# Patient Record
Sex: FEMALE | Race: AMERICAN INDIAN OR ALASKA NATIVE | HISPANIC OR LATINO | URBAN - METROPOLITAN AREA
[De-identification: names, ages, dates, MRNs, and addresses within clinical notes are randomized per-mention and may not be internally consistent; named-entity substitution may affect disease eponyms.]

---

## 2018-05-28 ENCOUNTER — EMERGENCY (EMERGENCY)
Facility: HOSPITAL | Age: 38
LOS: 1 days | Discharge: HOME | End: 2018-05-28
Attending: EMERGENCY MEDICINE | Admitting: EMERGENCY MEDICINE
Payer: MEDICAID

## 2018-05-28 VITALS
HEART RATE: 81 BPM | SYSTOLIC BLOOD PRESSURE: 123 MMHG | HEIGHT: 61.81 IN | TEMPERATURE: 98 F | OXYGEN SATURATION: 98 % | WEIGHT: 149.91 LBS | RESPIRATION RATE: 18 BRPM | DIASTOLIC BLOOD PRESSURE: 85 MMHG

## 2018-05-28 VITALS
RESPIRATION RATE: 18 BRPM | DIASTOLIC BLOOD PRESSURE: 83 MMHG | TEMPERATURE: 98 F | SYSTOLIC BLOOD PRESSURE: 120 MMHG | HEART RATE: 67 BPM | OXYGEN SATURATION: 98 %

## 2018-05-28 DIAGNOSIS — R10.11 RIGHT UPPER QUADRANT PAIN: ICD-10-CM

## 2018-05-28 DIAGNOSIS — N20.0 CALCULUS OF KIDNEY: ICD-10-CM

## 2018-05-28 LAB
ALBUMIN SERPL ELPH-MCNC: 4.8 G/DL — SIGNIFICANT CHANGE UP (ref 3.3–5)
ALP SERPL-CCNC: 72 U/L — SIGNIFICANT CHANGE UP (ref 40–120)
ALT FLD-CCNC: 33 U/L — SIGNIFICANT CHANGE UP (ref 10–45)
ANION GAP SERPL CALC-SCNC: 12 MMOL/L — SIGNIFICANT CHANGE UP (ref 5–17)
APPEARANCE UR: CLEAR — SIGNIFICANT CHANGE UP
AST SERPL-CCNC: 24 U/L — SIGNIFICANT CHANGE UP (ref 10–40)
BACTERIA # UR AUTO: PRESENT /HPF
BASOPHILS NFR BLD AUTO: 0.1 % — SIGNIFICANT CHANGE UP (ref 0–2)
BILIRUB SERPL-MCNC: 0.5 MG/DL — SIGNIFICANT CHANGE UP (ref 0.2–1.2)
BILIRUB UR-MCNC: NEGATIVE — SIGNIFICANT CHANGE UP
BUN SERPL-MCNC: 13 MG/DL — SIGNIFICANT CHANGE UP (ref 7–23)
CALCIUM SERPL-MCNC: 10 MG/DL — SIGNIFICANT CHANGE UP (ref 8.4–10.5)
CHLORIDE SERPL-SCNC: 98 MMOL/L — SIGNIFICANT CHANGE UP (ref 96–108)
CO2 SERPL-SCNC: 28 MMOL/L — SIGNIFICANT CHANGE UP (ref 22–31)
COLOR SPEC: YELLOW — SIGNIFICANT CHANGE UP
COMMENT - URINE: SIGNIFICANT CHANGE UP
CREAT SERPL-MCNC: 0.81 MG/DL — SIGNIFICANT CHANGE UP (ref 0.5–1.3)
DIFF PNL FLD: (no result)
EOSINOPHIL NFR BLD AUTO: 2.1 % — SIGNIFICANT CHANGE UP (ref 0–6)
EPI CELLS # UR: (no result) /HPF (ref 0–5)
GLUCOSE SERPL-MCNC: 98 MG/DL — SIGNIFICANT CHANGE UP (ref 70–99)
GLUCOSE UR QL: NEGATIVE — SIGNIFICANT CHANGE UP
HCT VFR BLD CALC: 42.3 % — SIGNIFICANT CHANGE UP (ref 34.5–45)
HGB BLD-MCNC: 14.5 G/DL — SIGNIFICANT CHANGE UP (ref 11.5–15.5)
KETONES UR-MCNC: (no result) MG/DL
LEUKOCYTE ESTERASE UR-ACNC: NEGATIVE — SIGNIFICANT CHANGE UP
LIDOCAIN IGE QN: 33 U/L — SIGNIFICANT CHANGE UP (ref 7–60)
LYMPHOCYTES # BLD AUTO: 26 % — SIGNIFICANT CHANGE UP (ref 13–44)
MCHC RBC-ENTMCNC: 27.9 PG — SIGNIFICANT CHANGE UP (ref 27–34)
MCHC RBC-ENTMCNC: 34.3 G/DL — SIGNIFICANT CHANGE UP (ref 32–36)
MCV RBC AUTO: 81.3 FL — SIGNIFICANT CHANGE UP (ref 80–100)
MONOCYTES NFR BLD AUTO: 6 % — SIGNIFICANT CHANGE UP (ref 2–14)
NEUTROPHILS NFR BLD AUTO: 65.8 % — SIGNIFICANT CHANGE UP (ref 43–77)
NITRITE UR-MCNC: NEGATIVE — SIGNIFICANT CHANGE UP
PH UR: 5 — SIGNIFICANT CHANGE UP (ref 5–8)
PLATELET # BLD AUTO: 392 K/UL — SIGNIFICANT CHANGE UP (ref 150–400)
POTASSIUM SERPL-MCNC: 3.8 MMOL/L — SIGNIFICANT CHANGE UP (ref 3.5–5.3)
POTASSIUM SERPL-SCNC: 3.8 MMOL/L — SIGNIFICANT CHANGE UP (ref 3.5–5.3)
PROT SERPL-MCNC: 8.1 G/DL — SIGNIFICANT CHANGE UP (ref 6–8.3)
PROT UR-MCNC: 100 MG/DL
RBC # BLD: 5.2 M/UL — SIGNIFICANT CHANGE UP (ref 3.8–5.2)
RBC # FLD: 13.8 % — SIGNIFICANT CHANGE UP (ref 10.3–16.9)
RBC CASTS # UR COMP ASSIST: < 5 /HPF — SIGNIFICANT CHANGE UP
SODIUM SERPL-SCNC: 138 MMOL/L — SIGNIFICANT CHANGE UP (ref 135–145)
SP GR SPEC: >=1.03 — SIGNIFICANT CHANGE UP (ref 1–1.03)
UROBILINOGEN FLD QL: 0.2 E.U./DL — SIGNIFICANT CHANGE UP
WBC # BLD: 8.7 K/UL — SIGNIFICANT CHANGE UP (ref 3.8–10.5)
WBC # FLD AUTO: 8.7 K/UL — SIGNIFICANT CHANGE UP (ref 3.8–10.5)
WBC UR QL: (no result) /HPF

## 2018-05-28 PROCEDURE — 96374 THER/PROPH/DIAG INJ IV PUSH: CPT

## 2018-05-28 PROCEDURE — 81001 URINALYSIS AUTO W/SCOPE: CPT

## 2018-05-28 PROCEDURE — 74176 CT ABD & PELVIS W/O CONTRAST: CPT | Mod: 26

## 2018-05-28 PROCEDURE — 99284 EMERGENCY DEPT VISIT MOD MDM: CPT | Mod: 25

## 2018-05-28 PROCEDURE — 80053 COMPREHEN METABOLIC PANEL: CPT

## 2018-05-28 PROCEDURE — 74176 CT ABD & PELVIS W/O CONTRAST: CPT

## 2018-05-28 PROCEDURE — 87086 URINE CULTURE/COLONY COUNT: CPT

## 2018-05-28 PROCEDURE — 83690 ASSAY OF LIPASE: CPT

## 2018-05-28 PROCEDURE — 85025 COMPLETE CBC W/AUTO DIFF WBC: CPT

## 2018-05-28 PROCEDURE — 36415 COLL VENOUS BLD VENIPUNCTURE: CPT

## 2018-05-28 PROCEDURE — 99284 EMERGENCY DEPT VISIT MOD MDM: CPT

## 2018-05-28 RX ORDER — SODIUM CHLORIDE 9 MG/ML
1000 INJECTION INTRAMUSCULAR; INTRAVENOUS; SUBCUTANEOUS ONCE
Qty: 0 | Refills: 0 | Status: COMPLETED | OUTPATIENT
Start: 2018-05-28 | End: 2018-05-28

## 2018-05-28 RX ORDER — SODIUM CHLORIDE 9 MG/ML
3 INJECTION INTRAMUSCULAR; INTRAVENOUS; SUBCUTANEOUS ONCE
Qty: 0 | Refills: 0 | Status: COMPLETED | OUTPATIENT
Start: 2018-05-28 | End: 2018-05-28

## 2018-05-28 RX ORDER — KETOROLAC TROMETHAMINE 30 MG/ML
30 SYRINGE (ML) INJECTION ONCE
Qty: 0 | Refills: 0 | Status: DISCONTINUED | OUTPATIENT
Start: 2018-05-28 | End: 2018-05-28

## 2018-05-28 RX ADMIN — SODIUM CHLORIDE 3 MILLILITER(S): 9 INJECTION INTRAMUSCULAR; INTRAVENOUS; SUBCUTANEOUS at 18:50

## 2018-05-28 RX ADMIN — Medication 30 MILLIGRAM(S): at 19:19

## 2018-05-28 RX ADMIN — SODIUM CHLORIDE 1000 MILLILITER(S): 9 INJECTION INTRAMUSCULAR; INTRAVENOUS; SUBCUTANEOUS at 18:50

## 2018-05-28 RX ADMIN — Medication 30 MILLIGRAM(S): at 19:04

## 2018-05-28 RX ADMIN — SODIUM CHLORIDE 2000 MILLILITER(S): 9 INJECTION INTRAMUSCULAR; INTRAVENOUS; SUBCUTANEOUS at 19:04

## 2018-05-28 NOTE — ED PROVIDER NOTE - PROGRESS NOTE DETAILS
post IVF and pain meds. CT with findings c/w possible passed right rrevealed post IVF and pain meds. CT with findings c/p po post IVF and pain meds. CT with findings c/w "two adjacent 5 mm nonobstructing right renal stones.   Mild right periureteral fat stranding, which may represent a recently passed stone. No left renal or ureteral calculus." Pt pain-free in ED. Repear abd exam is soft/NT. labs/ studies noted. UA with (+) blood and negative leuks and nitrites. Pt to f/up outpt.

## 2018-05-28 NOTE — ED PROVIDER NOTE - OBJECTIVE STATEMENT
36 yo F, with hx of kidney stones (no intervention in the past), C-sections X 3, no other PSH, no chronic meds p/w diffuse abd pain since last night and then overnight pt developed right flank and RUQ abd pain radiating to right groin. No N/V/change in BMs/ fevers/ chills/ dysuria/ gross hematuria/ other urinary sxs. No other complaints. Pain is intermittent and not associated with any positions or movements. No exacerbating or alleviating factors. Took some Aleve a few hours ago with minimal relief in pain.

## 2018-05-28 NOTE — ED PROVIDER NOTE - MEDICAL DECISION MAKING DETAILS
36 yo F, with hx of kidney stones (no intervention in the past), C-sections X 3, no other PSH, no chronic meds p/w diffuse abd pain since last night and then overnight pt developed right flank and RUQ abd pain radiating to right groin. No N/V/change in BMs/ fevers/ chills/ dysuria/ gross hematuria/ other urinary sxs. No other complaints. Pain is intermittent and not associated with any positions or movements. No exacerbating or alleviating factors. Took some Aleve a few hours ago with minimal relief in 38 yo F, with hx of kidney stones (no intervention in the past), C-sections X 3, no other PSH, no chronic meds p/w diffuse abd pain since last night and then overnight pt developed right flank and RUQ abd pain radiating to right groin. No N/V/change in BMs/ fevers/ chills/ dysuria/ gross hematuria/ other urinary sxs. No other complaints. Pain is intermittent and not associated with any positions or movements. No exacerbating or alleviating factors. Pt pain-free post IVF and pain meds. CT with findings c/w "two adjacent 5 mm nonobstructing right renal stones. Mild right periureteral fat stranding, which may represent a recently passed stone. No left renal or ureteral calculus." Pt pain-free in ED. Repeat abd exam is soft/NT. Labs/ studies noted. UA with (+) blood and negative leuks and nitrites. Pt to f/up outpt. Return precautions given.

## 2018-05-28 NOTE — ED PROVIDER NOTE - GASTROINTESTINAL, MLM
Abdomen soft, non-tender, no guarding. No CVAT b/l Abdomen soft, ?mild right upper quadrant TTP, no guarding. No CVAT b/l

## 2018-05-28 NOTE — ED ADULT TRIAGE NOTE - CHIEF COMPLAINT QUOTE
Patient c/o RUQ abdominal pain since last night , denies any nausea , vomiting nor dysuria . Was sent from urgent care for evaluation .

## 2018-05-29 LAB
CULTURE RESULTS: NO GROWTH — SIGNIFICANT CHANGE UP
SPECIMEN SOURCE: SIGNIFICANT CHANGE UP

## 2019-04-27 ENCOUNTER — EMERGENCY (EMERGENCY)
Facility: HOSPITAL | Age: 39
LOS: 1 days | Discharge: HOME | End: 2019-04-27
Attending: EMERGENCY MEDICINE | Admitting: EMERGENCY MEDICINE
Payer: MEDICAID

## 2019-04-27 VITALS
DIASTOLIC BLOOD PRESSURE: 86 MMHG | TEMPERATURE: 98 F | RESPIRATION RATE: 16 BRPM | WEIGHT: 184.09 LBS | SYSTOLIC BLOOD PRESSURE: 123 MMHG | OXYGEN SATURATION: 97 % | HEART RATE: 111 BPM

## 2019-04-27 VITALS
HEART RATE: 108 BPM | SYSTOLIC BLOOD PRESSURE: 131 MMHG | DIASTOLIC BLOOD PRESSURE: 88 MMHG | OXYGEN SATURATION: 99 % | TEMPERATURE: 98 F | RESPIRATION RATE: 18 BRPM

## 2019-04-27 LAB
ALBUMIN SERPL ELPH-MCNC: 4.9 G/DL — SIGNIFICANT CHANGE UP (ref 3.3–5)
ALP SERPL-CCNC: 49 U/L — SIGNIFICANT CHANGE UP (ref 40–120)
ALT FLD-CCNC: 18 U/L — SIGNIFICANT CHANGE UP (ref 10–45)
ANION GAP SERPL CALC-SCNC: 13 MMOL/L — SIGNIFICANT CHANGE UP (ref 5–17)
APPEARANCE UR: CLEAR — SIGNIFICANT CHANGE UP
AST SERPL-CCNC: 35 U/L — SIGNIFICANT CHANGE UP (ref 10–40)
BACTERIA # UR AUTO: PRESENT /HPF
BASOPHILS # BLD AUTO: 0.04 K/UL — SIGNIFICANT CHANGE UP (ref 0–0.2)
BASOPHILS NFR BLD AUTO: 0.5 % — SIGNIFICANT CHANGE UP (ref 0–2)
BILIRUB SERPL-MCNC: 0.6 MG/DL — SIGNIFICANT CHANGE UP (ref 0.2–1.2)
BILIRUB UR-MCNC: NEGATIVE — SIGNIFICANT CHANGE UP
BUN SERPL-MCNC: 14 MG/DL — SIGNIFICANT CHANGE UP (ref 7–23)
CALCIUM SERPL-MCNC: 10.6 MG/DL — HIGH (ref 8.4–10.5)
CHLORIDE SERPL-SCNC: 101 MMOL/L — SIGNIFICANT CHANGE UP (ref 96–108)
CK SERPL-CCNC: 190 U/L — HIGH (ref 25–170)
CO2 SERPL-SCNC: 28 MMOL/L — SIGNIFICANT CHANGE UP (ref 22–31)
COLOR SPEC: YELLOW — SIGNIFICANT CHANGE UP
CREAT SERPL-MCNC: 0.76 MG/DL — SIGNIFICANT CHANGE UP (ref 0.5–1.3)
DIFF PNL FLD: ABNORMAL
EOSINOPHIL # BLD AUTO: 0.1 K/UL — SIGNIFICANT CHANGE UP (ref 0–0.5)
EOSINOPHIL NFR BLD AUTO: 1.2 % — SIGNIFICANT CHANGE UP (ref 0–6)
EPI CELLS # UR: ABNORMAL /HPF (ref 0–5)
GLUCOSE SERPL-MCNC: 110 MG/DL — HIGH (ref 70–99)
GLUCOSE UR QL: NEGATIVE — SIGNIFICANT CHANGE UP
HCT VFR BLD CALC: 41.5 % — SIGNIFICANT CHANGE UP (ref 34.5–45)
HGB BLD-MCNC: 14 G/DL — SIGNIFICANT CHANGE UP (ref 11.5–15.5)
IMM GRANULOCYTES NFR BLD AUTO: 0.1 % — SIGNIFICANT CHANGE UP (ref 0–1.5)
KETONES UR-MCNC: NEGATIVE — SIGNIFICANT CHANGE UP
LACTATE SERPL-SCNC: 1.7 MMOL/L — SIGNIFICANT CHANGE UP (ref 0.5–2)
LEUKOCYTE ESTERASE UR-ACNC: NEGATIVE — SIGNIFICANT CHANGE UP
LYMPHOCYTES # BLD AUTO: 2.07 K/UL — SIGNIFICANT CHANGE UP (ref 1–3.3)
LYMPHOCYTES # BLD AUTO: 25.7 % — SIGNIFICANT CHANGE UP (ref 13–44)
MCHC RBC-ENTMCNC: 28.7 PG — SIGNIFICANT CHANGE UP (ref 27–34)
MCHC RBC-ENTMCNC: 33.7 GM/DL — SIGNIFICANT CHANGE UP (ref 32–36)
MCV RBC AUTO: 85.2 FL — SIGNIFICANT CHANGE UP (ref 80–100)
MONOCYTES # BLD AUTO: 0.48 K/UL — SIGNIFICANT CHANGE UP (ref 0–0.9)
MONOCYTES NFR BLD AUTO: 5.9 % — SIGNIFICANT CHANGE UP (ref 2–14)
NEUTROPHILS # BLD AUTO: 5.37 K/UL — SIGNIFICANT CHANGE UP (ref 1.8–7.4)
NEUTROPHILS NFR BLD AUTO: 66.6 % — SIGNIFICANT CHANGE UP (ref 43–77)
NITRITE UR-MCNC: NEGATIVE — SIGNIFICANT CHANGE UP
NRBC # BLD: 0 /100 WBCS — SIGNIFICANT CHANGE UP (ref 0–0)
PH UR: 7 — SIGNIFICANT CHANGE UP (ref 5–8)
PLATELET # BLD AUTO: 468 K/UL — HIGH (ref 150–400)
POTASSIUM SERPL-MCNC: 4.1 MMOL/L — SIGNIFICANT CHANGE UP (ref 3.5–5.3)
POTASSIUM SERPL-SCNC: 4.1 MMOL/L — SIGNIFICANT CHANGE UP (ref 3.5–5.3)
PROT SERPL-MCNC: 8.2 G/DL — SIGNIFICANT CHANGE UP (ref 6–8.3)
PROT UR-MCNC: NEGATIVE MG/DL — SIGNIFICANT CHANGE UP
RBC # BLD: 4.87 M/UL — SIGNIFICANT CHANGE UP (ref 3.8–5.2)
RBC # FLD: 12.7 % — SIGNIFICANT CHANGE UP (ref 10.3–14.5)
RBC CASTS # UR COMP ASSIST: < 5 /HPF — SIGNIFICANT CHANGE UP
SODIUM SERPL-SCNC: 142 MMOL/L — SIGNIFICANT CHANGE UP (ref 135–145)
SP GR SPEC: <=1.005 — SIGNIFICANT CHANGE UP (ref 1–1.03)
UROBILINOGEN FLD QL: 0.2 E.U./DL — SIGNIFICANT CHANGE UP
WBC # BLD: 8.07 K/UL — SIGNIFICANT CHANGE UP (ref 3.8–10.5)
WBC # FLD AUTO: 8.07 K/UL — SIGNIFICANT CHANGE UP (ref 3.8–10.5)
WBC UR QL: < 5 /HPF — SIGNIFICANT CHANGE UP

## 2019-04-27 PROCEDURE — 93005 ELECTROCARDIOGRAM TRACING: CPT

## 2019-04-27 PROCEDURE — 80053 COMPREHEN METABOLIC PANEL: CPT

## 2019-04-27 PROCEDURE — 83605 ASSAY OF LACTIC ACID: CPT

## 2019-04-27 PROCEDURE — 99284 EMERGENCY DEPT VISIT MOD MDM: CPT | Mod: 25

## 2019-04-27 PROCEDURE — 81001 URINALYSIS AUTO W/SCOPE: CPT

## 2019-04-27 PROCEDURE — 99283 EMERGENCY DEPT VISIT LOW MDM: CPT

## 2019-04-27 PROCEDURE — 82550 ASSAY OF CK (CPK): CPT

## 2019-04-27 PROCEDURE — 85025 COMPLETE CBC W/AUTO DIFF WBC: CPT

## 2019-04-27 PROCEDURE — 36415 COLL VENOUS BLD VENIPUNCTURE: CPT

## 2019-04-27 PROCEDURE — 93010 ELECTROCARDIOGRAM REPORT: CPT

## 2019-04-27 RX ORDER — DIAZEPAM 5 MG
5 TABLET ORAL ONCE
Qty: 0 | Refills: 0 | Status: DISCONTINUED | OUTPATIENT
Start: 2019-04-27 | End: 2019-04-27

## 2019-04-27 RX ORDER — SODIUM CHLORIDE 9 MG/ML
1000 INJECTION INTRAMUSCULAR; INTRAVENOUS; SUBCUTANEOUS ONCE
Qty: 0 | Refills: 0 | Status: COMPLETED | OUTPATIENT
Start: 2019-04-27 | End: 2019-04-27

## 2019-04-27 RX ADMIN — Medication 5 MILLIGRAM(S): at 16:25

## 2019-04-27 RX ADMIN — SODIUM CHLORIDE 2000 MILLILITER(S): 9 INJECTION INTRAMUSCULAR; INTRAVENOUS; SUBCUTANEOUS at 15:19

## 2019-04-27 NOTE — ED ADULT NURSE NOTE - CHPI ED NUR SYMPTOMS NEG
no vomiting/no nausea/no fever/no chills/no tingling/no decreased eating/drinking/no weakness/no dizziness

## 2019-04-27 NOTE — ED PROVIDER NOTE - ATTENDING CONTRIBUTION TO CARE
here with cramps of arms/ legs.  appears anxious.  normal vs.  labs with normal electrolytes.  plan hydration, valium, outpatient f/u

## 2019-04-27 NOTE — ED PROVIDER NOTE - NSFOLLOWUPINSTRUCTIONS_ED_ALL_ED_FT
Follow up with your primary care provider this week.     [facility]    Leg Cramps  Leg cramps occur when a muscle or muscles tighten and you have no control over this tightening (involuntary muscle contraction). Muscle cramps can develop in any muscle, but the most common place is in the calf muscles of the leg. Those cramps can occur during exercise or when you are at rest. Leg cramps are painful, and they may last for a few seconds to a few minutes. Cramps may return several times before they finally stop.    Usually, leg cramps are not caused by a serious medical problem. In many cases, the cause is not known. Some common causes include:    Overexertion.  Overuse from repetitive motions, or doing the same thing over and over.  Remaining in a certain position for a long period of time.  Improper preparation, form, or technique while performing a sport or an activity.  Dehydration.  Injury.  Side effects of some medicines.  Abnormally low levels of the salts and ions in your blood (electrolytes), especially potassium and calcium. These levels could be low if you are taking water pills (diuretics) or if you are pregnant.    Follow these instructions at home:  Watch your condition for any changes. Taking the following actions may help to lessen any discomfort that you are feeling:    Stay well-hydrated. Drink enough fluid to keep your urine clear or pale yellow.  Try massaging, stretching, and relaxing the affected muscle. Do this for several minutes at a time.  For tight or tense muscles, use a warm towel, heating pad, or hot shower water directed to the affected area.  If you are sore or have pain after a cramp, applying ice to the affected area may relieve discomfort.    Put ice in a plastic bag.  Place a towel between your skin and the bag.  Leave the ice on for 20 minutes, 2–3 times per day.    Avoid strenuous exercise for several days if you have been having frequent leg cramps.  Make sure that your diet includes the essential minerals for your muscles to work normally.  Take medicines only as directed by your health care provider.    Contact a health care provider if:  Your leg cramps get more severe or more frequent, or they do not improve over time.  Your foot becomes cold, numb, or blue.  This information is not intended to replace advice given to you by your health care provider. Make sure you discuss any questions you have with your health care provider.    Document Released: 01/25/2006 Document Revised: 05/25/2017 Document Reviewed: 11/25/2015  InOpen Interactive Patient Education © 2018 [employer]        EnglishSpanish    Dizziness  Dizziness is a common problem. It makes you feel unsteady or light-headed. You may feel like you are about to pass out (faint). Dizziness can lead to getting hurt if you stumble or fall. Dizziness can be caused by many things, including:    Medicines.  Not having enough water in your body (dehydration).  Illness.    Follow these instructions at home:  Eating and drinking     Drink enough fluid to keep your pee (urine) clear or pale yellow. This helps to keep you from getting dehydrated. Try to drink more clear fluids, such as water.  Do not drink alcohol.  Limit how much caffeine you drink or eat, if your doctor tells you to do that.  ImageLimit how much salt (sodium) you drink or eat, if your doctor tells you to do that.  Activity     Avoid making quick movements.    When you stand up from sitting in a chair, steady yourself until you feel okay.  In the morning, first sit up on the side of the bed. When you feel okay, stand slowly while you hold onto something. Do this until you know that your balance is fine.    If you need to  one place for a long time, move your legs often. Tighten and relax the muscles in your legs while you are standing.  Do not drive or use heavy machinery if you feel dizzy.  ImageAvoid bending down if you feel dizzy. Place items in your home so you can reach them easily without leaning over.  Lifestyle     Do not use any products that contain nicotine or tobacco, such as cigarettes and e-cigarettes. If you need help quitting, ask your doctor.  Try to lower your stress level. You can do this by using methods such as yoga or meditation. Talk with your doctor if you need help.  General instructions     Watch your dizziness for any changes.  Take over-the-counter and prescription medicines only as told by your doctor. Talk with your doctor if you think that you are dizzy because of a medicine that you are taking.  Tell a friend or a family member that you are feeling dizzy. If he or she notices any changes in your behavior, have this person call your doctor.  Keep all follow-up visits as told by your doctor. This is important.  Contact a doctor if:  Your dizziness does not go away.  Your dizziness or light-headedness gets worse.  You feel sick to your stomach (nauseous).  You have trouble hearing.  You have new symptoms.  You are unsteady on your feet.  You feel like the room is spinning.  Get help right away if:  You throw up (vomit) or have watery poop (diarrhea), and you cannot eat or drink anything.  You have trouble:    Talking.  Walking.  Swallowing.  Using your arms, hands, or legs.    You feel generally weak.  You are not thinking clearly, or you have trouble forming sentences. A friend or family member may notice this.  You have:    Chest pain.  Pain in your belly (abdomen).  Shortness of breath.  Sweating.    Your vision changes.  You are bleeding.  You have a very bad headache.  You have neck pain or a stiff neck.  You have a fever.  These symptoms may be an emergency. Do not wait to see if the symptoms will go away. Get medical help right away. Call your local emergency services (911 in the [place] Do not drive yourself to the hospital.     Summary  Dizziness makes you feel unsteady or light-headed. You may feel like you are about to pass out (faint).  Drink enough fluid to keep your pee (urine) clear or pale yellow. Do not drink alcohol.  Avoid making quick movements if you feel dizzy.  Watch your dizziness for any changes.  This information is not intended to replace advice given to you by your health care provider. Make sure you discuss any questions you have with your health care provider.    Document Released: 12/06/2012 Document Revised: 01/04/2018 Document Reviewed: 01/04/2018  Elsevier Interactive Patient Education © 2019 [employer]

## 2019-04-27 NOTE — ED ADULT TRIAGE NOTE - CHIEF COMPLAINT QUOTE
Called ems because she started having cramps to bilateral arms and legs that got worse today.  Patient has been taking weight loss pills for the past 2 weeks.

## 2019-04-27 NOTE — ED PROVIDER NOTE - CLINICAL SUMMARY MEDICAL DECISION MAKING FREE TEXT BOX
cramping and dizziness. ? diet medication vs dehydration vs anxiety. ECG no ischemic changes to suggest ACS. labs noted. no electrolyte abnormalities. sx's resolved with fluid and valium in ED. f/u with pmd. return precautions d/w pt.

## 2019-04-27 NOTE — ED ADULT NURSE NOTE - OBJECTIVE STATEMENT
Pt states she has been having a problem with having cold feet and told she started to have severe cramps to both arms and legs.

## 2019-04-27 NOTE — ED PROVIDER NOTE - OBJECTIVE STATEMENT
39 y/o female with no sig PMHx c/o cramping and dizziness. pt states she has been taking a diet medication for past 15 days. pt notes long hx of cramping to arms and legs but worse this am. pt states felt dizzy at the time and BP noted to be low at home. no cp or sob. no abd pain, n/.v. pt states she feels dehydrated. pt notes she has been eating and drinking water. no urinary sx's. no further complaints. translation by daughter at pt's request. pt declined language line use.

## 2019-05-01 DIAGNOSIS — R25.2 CRAMP AND SPASM: ICD-10-CM

## 2019-05-01 DIAGNOSIS — R42 DIZZINESS AND GIDDINESS: ICD-10-CM

## 2020-03-24 ENCOUNTER — EMERGENCY (EMERGENCY)
Facility: HOSPITAL | Age: 40
LOS: 1 days | Discharge: HOME | End: 2020-03-24
Admitting: EMERGENCY MEDICINE
Payer: MEDICAID

## 2020-03-24 VITALS
SYSTOLIC BLOOD PRESSURE: 127 MMHG | OXYGEN SATURATION: 97 % | RESPIRATION RATE: 18 BRPM | TEMPERATURE: 98 F | WEIGHT: 153 LBS | DIASTOLIC BLOOD PRESSURE: 90 MMHG | HEART RATE: 86 BPM | HEIGHT: 62 IN

## 2020-03-24 DIAGNOSIS — R05 COUGH: ICD-10-CM

## 2020-03-24 DIAGNOSIS — J06.9 ACUTE UPPER RESPIRATORY INFECTION, UNSPECIFIED: ICD-10-CM

## 2020-03-24 LAB — RAPID RVP RESULT: SIGNIFICANT CHANGE UP

## 2020-03-24 PROCEDURE — 99283 EMERGENCY DEPT VISIT LOW MDM: CPT

## 2020-03-24 PROCEDURE — 87798 DETECT AGENT NOS DNA AMP: CPT

## 2020-03-24 PROCEDURE — 99284 EMERGENCY DEPT VISIT MOD MDM: CPT

## 2020-03-24 PROCEDURE — 87486 CHLMYD PNEUM DNA AMP PROBE: CPT

## 2020-03-24 PROCEDURE — 87633 RESP VIRUS 12-25 TARGETS: CPT

## 2020-03-24 PROCEDURE — 87581 M.PNEUMON DNA AMP PROBE: CPT

## 2020-03-24 PROCEDURE — 87635 SARS-COV-2 COVID-19 AMP PRB: CPT

## 2020-03-24 NOTE — ED PROVIDER NOTE - NSFOLLOWUPINSTRUCTIONS_ED_ALL_ED_FT
COVID-19  COVID-19, also known as coronavirus disease or novel coronavirus, is caused by a type of virus that causes respiratory illness. This may lead to inflammation and the buildup of mucus and fluids in the airway of the lungs (pneumonia). There are many different coronaviruses. Most of these viruses only affect animals, but sometimes these viruses can change and infect people.  What are the causes?  This illness is caused by a virus. You may catch the virus by:  Breathing in droplets from an infected person's cough or sneeze.Touching something, like a table or a doorknob, that was exposed to the virus (contaminated) and then touching your mouth, nose, or eyes.Being around animals that carry the virus, or eating uncooked or undercooked meat or animal products that contain the virus.What increases the risk?  You are more likely to develop this condition if you:  Live in or travel to an area with a COVID-19 outbreak.Come in contact with a sick person who recently traveled to an area with a COVID-19 outbreak.Provide care for or live with a person who is infected with COVID-19.What are the signs or symptoms?  COVID-19 causes respiratory illness that can lead to pneumonia. Symptoms of pneumonia may include:  A fever.A cough.Difficulty breathing.How is this diagnosed?  This condition may be diagnosed based on:  Your signs and symptoms, especially if:  You live in an area with a COVID-19 outbreak.You recently traveled to or from an area where the virus is common.You provide care for or live with a person who was diagnosed with COVID-19.A physical exam.Lab tests, which may include:  A nasal swab to take a sample of fluid from your nose.A throat swab to take a sample of fluid from your throat.A sample of mucus from your lungs (sputum).Blood tests.How is this treated?  There is no medicine to treat COVID-19. Your health care provider will talk with you about ways to treat your symptoms. This may include rest, fluids, and over-the-counter medicines.  Follow these instructions at home:  Lifestyle     Use a cool-mist humidifier to add moisture to the air. This can help you breathe more easily.Do not use any products that contain nicotine or tobacco, such as cigarettes, e-cigarettes, and chewing tobacco. If you need help quitting, ask your health care provider.Rest at home as told by your health care provider.Return to your normal activities as told by your health care provider. Ask your health care provider what activities are safe for you.General instructions     Take over-the-counter and prescription medicines only as told by your health care provider.Drink enough fluid to keep your urine pale yellow.Keep all follow-up visits as told by your health care provider. This is important.    How is this prevented?     There is no vaccine to help prevent COVID-19 infection. However, there are steps you can take to protect yourself and others from this virus.  To protect yourself:     Do not travel to areas where COVID-19 is a risk. The areas where COVID-19 is reported change often. To identify high-risk areas, check the CDC travel website: wwwnc.cdc.gov/travel/noticesIf you live in, or must travel to, an area where COVID-19 is a risk, take precautions to avoid infection.  Stay away from people who are sick.Stay away from places where there are animals that may carry the virus. This includes places where animals and animal products are sold. Note that both living and dead animals can carry the virus.Wash your hands often with soap and water. If soap and water are not available, use an alcohol-based hand .Avoid touching your mouth, face, eyes, or nose.To protect others:     If you have symptoms, take steps to prevent the virus from spreading to others.  If you think you have a COVID-19 infection, contact your health care provider right away. Tell your health care team that you think you may have a COVID-19 infection. Stay home. Leave your house only to seek medical care. Do not travel while you are sick.Wash your hands often with soap and water. If soap and water are not available, use alcohol-based hand . Stay away from other members of your household. If possible, stay in your own room, separate from others. Use a different bathroom.Make sure that all people in your household wash their hands well and often. Cough or sneeze into a tissue or your sleeve or elbow. Do not cough or sneeze into your hand or into the air. Wear a face mask.    Where to find more information  Centers for Disease Control and Prevention: www.cdc.gov/coronavirus/2019-ncov/index.htmlWFranciscan Health Health Organization: www.who.int/health-topics/coronavirusContact a health care provider if:  You have traveled to an area where COVID-19 is a risk and you have symptoms of the infection.You have contact with someone who has traveled to an area where COVID-19 is a risk and you have symptoms of the infection.Get help right away if:  You have trouble breathing.You have chest pain.    Summary  COVID-19 is caused by a type of virus that causes respiratory illness. This may lead to inflammation and the buildup of mucus and fluids in the airway of the lungs (pneumonia).You are more likely to develop this condition if you live in or travel to an area with a COVID-19 outbreak.There is no medicine to treat COVID-19. Your health care provider will talk with you about ways to treat your symptoms.Take steps to protect yourself and others from infection. Wash your hands often. Stay away from other people who are sick and wear a mask if you are sick.This information is not intended to replace advice given to you by your health care provider. Make sure you discuss any questions you have with your health care provider.

## 2020-03-24 NOTE — ED ADULT NURSE NOTE - OBJECTIVE STATEMENT
Pt came to ER stating "I have been feeling sick and I can't take it anymore, it is very difficult not to know if I have the virus".

## 2020-03-24 NOTE — ED PROVIDER NOTE - PRO INTERPRETER NEED 2
Verified Results  (1) BUN [id number] 08:14AM Marko Kee Order Number: [id number]     Test Name Result Flag Reference   BLOOD UREA NITROGEN 11 mg/dL  5-25   Performing Comments: MRI Protcol      Performing Comments: MRI ProtcolPerforming Comments: MRI Protocol     (1) CREATININE [id number] 08:14AM Marko Kee Order Number: [id number]     Test Name Result Flag Reference   CREATININE 0 78 mg/dL  0 60-1 30   Standardized to IDMS reference method   eGFR Non-African American      >60 0 ml/min/1 73sq m   Performing Comments: MRI Protocol      Performing Comments: MRI ProtcolPerforming Comments: MRI Protocol  National Kidney Disease Education Program recommendations are as follows:  GFR calculation is accurate only with a steady state creatinine  Chronic Kidney disease less than 60 ml/min/1 73 sq  meters  Kidney failure less than 15 ml/min/1 73 sq  meters  CREATININE 0 78 mg/dL  0 60-1 30   Standardized to IDMS reference method   eGFR Non-African American      >60 0 ml/min/1 73sq m   Performing Comments: MRI Protocol      Performing Comments: MRI ProtcolPerforming Comments: MRI Protocol  National Kidney Disease Education Program recommendations are as follows:  GFR calculation is accurate only with a steady state creatinine  Chronic Kidney disease less than 60 ml/min/1 73 sq  meters  Kidney failure less than 15 ml/min/1 73 sq  meters                     Plan  Hypertension    · (1) BUN; Status:Complete;   Done: [id number] 08:14AM   · (1) CREATININE; Status:Complete;   Done: [id number] 08:14AM Filipino

## 2020-03-24 NOTE — ED PROVIDER NOTE - NS ED ROS FT
Constitutional: +fever. +chills.  Eyes: No redness. No discharge. No vision change.   ENT: No sore throat. No ear pain.  Cardiovascular: No chest pain. No leg swelling.  Respiratory: +cough. No shortness of breath.  GI: No abdominal pain. No vomiting. No diarrhea.   MSK: No joint pain. No back pain.   Skin: No rash. No abrasions.   Neuro: No numbness. No weakness.   Psych: No known mental health issues.

## 2020-03-24 NOTE — ED PROVIDER NOTE - OBJECTIVE STATEMENT
38yo female with pmhx of arthritis and HTN presents with 5 days of fever (tmax 100.2F), chills, loss of taste, dry cough. Denies HA, sore throat, shortness of breath, chest pain, abd pain, N/V/D. Denies recent travel, denies known COVID exposure.  has similar symptoms. Taking ibuprofen for symptoms.

## 2020-03-24 NOTE — ED PROVIDER NOTE - CLINICAL SUMMARY MEDICAL DECISION MAKING FREE TEXT BOX
ED course unremarkable - afebrile and hemodynamically stable. No hypoxia on RA. Well appearing and lungs CTAB without increased respiratory effort. Suspect viral URI, possibly COVID19. COVID and RVP sent and pending. Currently low risk with mild symptoms so recommend symptomatic/supportive care, self-quarantine x14 days, and self-isolation within the home. Will contact PMD for followup. Discussed expected course of illness as well as return precautions and pt verbalized understanding.

## 2020-03-24 NOTE — ED PROVIDER NOTE - PATIENT PORTAL LINK FT
You can access the FollowMyHealth Patient Portal offered by [facility] by registering at the following website: [email or web address]. By joining [facility]’s FollowMyHealth portal, you will also be able to view your health information using other applications (apps) compatible with our system.

## 2020-03-25 PROBLEM — Z78.9 OTHER SPECIFIED HEALTH STATUS: Chronic | Status: ACTIVE | Noted: 2019-04-27

## 2020-03-25 LAB — SARS-COV-2 RNA SPEC QL NAA+PROBE: DETECTED

## 2020-11-17 ENCOUNTER — APPOINTMENT (OUTPATIENT)
Dept: INTERNAL MEDICINE | Facility: CLINIC | Age: 40
End: 2020-11-17
Payer: MEDICAID

## 2020-11-17 ENCOUNTER — MED ADMIN CHARGE (OUTPATIENT)
Age: 40
End: 2020-11-17

## 2020-11-17 VITALS
WEIGHT: 160 LBS | HEART RATE: 73 BPM | BODY MASS INDEX: 29.44 KG/M2 | SYSTOLIC BLOOD PRESSURE: 142 MMHG | TEMPERATURE: 98 F | DIASTOLIC BLOOD PRESSURE: 97 MMHG | HEIGHT: 62 IN | OXYGEN SATURATION: 99 %

## 2020-11-17 DIAGNOSIS — Z23 ENCOUNTER FOR IMMUNIZATION: ICD-10-CM

## 2020-11-17 PROCEDURE — 90686 IIV4 VACC NO PRSV 0.5 ML IM: CPT

## 2020-11-17 PROCEDURE — 99386 PREV VISIT NEW AGE 40-64: CPT | Mod: GC,25

## 2020-11-17 PROCEDURE — G0008: CPT

## 2020-11-17 PROCEDURE — 99205 OFFICE O/P NEW HI 60 MIN: CPT | Mod: 25,GC

## 2020-11-17 PROCEDURE — 36415 COLL VENOUS BLD VENIPUNCTURE: CPT

## 2020-11-17 PROCEDURE — 99072 ADDL SUPL MATRL&STAF TM PHE: CPT

## 2020-11-17 RX ORDER — METOPROLOL SUCCINATE 50 MG/1
50 TABLET, EXTENDED RELEASE ORAL
Refills: 0 | Status: DISCONTINUED | COMMUNITY
Start: 2020-11-17 | End: 2020-11-17

## 2020-11-17 RX ORDER — OMEPRAZOLE 10 MG/1
10 CAPSULE, DELAYED RELEASE ORAL
Qty: 30 | Refills: 0 | Status: DISCONTINUED | COMMUNITY
Start: 2020-11-17 | End: 2020-11-17

## 2020-11-17 RX ORDER — TIZANIDINE HYDROCHLORIDE 2 MG/1
2 CAPSULE ORAL
Qty: 30 | Refills: 1 | Status: DISCONTINUED | COMMUNITY
Start: 2020-11-17 | End: 2020-11-17

## 2020-11-18 ENCOUNTER — APPOINTMENT (OUTPATIENT)
Dept: INTERNAL MEDICINE | Facility: CLINIC | Age: 40
End: 2020-11-18

## 2020-11-23 LAB — UREA BREATH TEST QL: POSITIVE

## 2020-11-30 LAB
ALBUMIN SERPL ELPH-MCNC: 4.8 G/DL
ALP BLD-CCNC: 54 U/L
ALT SERPL-CCNC: 24 U/L
ANION GAP SERPL CALC-SCNC: 12 MMOL/L
AST SERPL-CCNC: 21 U/L
BASOPHILS # BLD AUTO: 0.03 K/UL
BASOPHILS NFR BLD AUTO: 0.6 %
BILIRUB SERPL-MCNC: 0.3 MG/DL
BUN SERPL-MCNC: 17 MG/DL
CALCIUM SERPL-MCNC: 9.8 MG/DL
CHLORIDE SERPL-SCNC: 101 MMOL/L
CHOLEST SERPL-MCNC: 282 MG/DL
CO2 SERPL-SCNC: 25 MMOL/L
CREAT SERPL-MCNC: 0.67 MG/DL
EOSINOPHIL # BLD AUTO: 0.16 K/UL
EOSINOPHIL NFR BLD AUTO: 3.1 %
GLUCOSE SERPL-MCNC: 96 MG/DL
HCT VFR BLD CALC: 40 %
HDLC SERPL-MCNC: 54 MG/DL
HGB BLD-MCNC: 12.3 G/DL
IMM GRANULOCYTES NFR BLD AUTO: 0.2 %
LDLC SERPL CALC-MCNC: 204 MG/DL
LYMPHOCYTES # BLD AUTO: 1.93 K/UL
LYMPHOCYTES NFR BLD AUTO: 37.5 %
MAN DIFF?: NORMAL
MCHC RBC-ENTMCNC: 28.1 PG
MCHC RBC-ENTMCNC: 30.8 GM/DL
MCV RBC AUTO: 91.3 FL
MONOCYTES # BLD AUTO: 0.36 K/UL
MONOCYTES NFR BLD AUTO: 7 %
NEUTROPHILS # BLD AUTO: 2.66 K/UL
NEUTROPHILS NFR BLD AUTO: 51.6 %
NONHDLC SERPL-MCNC: 228 MG/DL
PLATELET # BLD AUTO: 420 K/UL
POTASSIUM SERPL-SCNC: 4.2 MMOL/L
PROT SERPL-MCNC: 7.2 G/DL
RBC # BLD: 4.38 M/UL
RBC # FLD: 13.8 %
SODIUM SERPL-SCNC: 137 MMOL/L
TRIGL SERPL-MCNC: 121 MG/DL
WBC # FLD AUTO: 5.15 K/UL

## 2020-12-17 ENCOUNTER — RX RENEWAL (OUTPATIENT)
Age: 40
End: 2020-12-17

## 2021-02-16 ENCOUNTER — LABORATORY RESULT (OUTPATIENT)
Age: 41
End: 2021-02-16

## 2021-02-16 ENCOUNTER — APPOINTMENT (OUTPATIENT)
Dept: INTERNAL MEDICINE | Facility: CLINIC | Age: 41
End: 2021-02-16
Payer: MEDICAID

## 2021-02-16 VITALS
DIASTOLIC BLOOD PRESSURE: 88 MMHG | WEIGHT: 164 LBS | HEART RATE: 80 BPM | OXYGEN SATURATION: 98 % | HEIGHT: 62 IN | TEMPERATURE: 98.1 F | BODY MASS INDEX: 30.18 KG/M2 | SYSTOLIC BLOOD PRESSURE: 126 MMHG

## 2021-02-16 PROCEDURE — 99072 ADDL SUPL MATRL&STAF TM PHE: CPT

## 2021-02-16 PROCEDURE — 99214 OFFICE O/P EST MOD 30 MIN: CPT | Mod: 25,GC

## 2021-02-16 RX ORDER — OMEPRAZOLE 20 MG/1
20 TABLET, DELAYED RELEASE ORAL
Qty: 28 | Refills: 0 | Status: DISCONTINUED | COMMUNITY
Start: 2020-12-17 | End: 2021-02-16

## 2021-02-16 RX ORDER — AMOXICILLIN 500 MG/1
500 CAPSULE ORAL TWICE DAILY
Qty: 56 | Refills: 0 | Status: DISCONTINUED | COMMUNITY
Start: 2020-11-23 | End: 2021-02-16

## 2021-02-16 RX ORDER — CLARITHROMYCIN 500 MG/1
500 TABLET, FILM COATED ORAL
Qty: 28 | Refills: 0 | Status: DISCONTINUED | COMMUNITY
Start: 2020-11-23 | End: 2021-02-16

## 2021-02-16 RX ORDER — OMEPRAZOLE 20 MG/1
20 CAPSULE, DELAYED RELEASE ORAL DAILY
Qty: 28 | Refills: 0 | Status: DISCONTINUED | COMMUNITY
Start: 2020-11-23 | End: 2021-02-16

## 2021-02-19 LAB
BASOPHILS # BLD AUTO: 0.03 K/UL
BASOPHILS NFR BLD AUTO: 0.5 %
EOSINOPHIL # BLD AUTO: 0.21 K/UL
EOSINOPHIL NFR BLD AUTO: 3.7 %
HCT VFR BLD CALC: 41.5 %
HGB BLD-MCNC: 12.5 G/DL
IMM GRANULOCYTES NFR BLD AUTO: 0.2 %
LYMPHOCYTES # BLD AUTO: 2.23 K/UL
LYMPHOCYTES NFR BLD AUTO: 38.9 %
MAN DIFF?: NORMAL
MCHC RBC-ENTMCNC: 27.4 PG
MCHC RBC-ENTMCNC: 30.1 GM/DL
MCV RBC AUTO: 90.8 FL
MONOCYTES # BLD AUTO: 0.48 K/UL
MONOCYTES NFR BLD AUTO: 8.4 %
NEUTROPHILS # BLD AUTO: 2.77 K/UL
NEUTROPHILS NFR BLD AUTO: 48.3 %
PLATELET # BLD AUTO: 379 K/UL
RBC # BLD: 4.57 M/UL
RBC # FLD: 14.4 %
WBC # FLD AUTO: 5.73 K/UL

## 2021-02-26 ENCOUNTER — NON-APPOINTMENT (OUTPATIENT)
Age: 41
End: 2021-02-26

## 2021-02-26 ENCOUNTER — APPOINTMENT (OUTPATIENT)
Dept: RHEUMATOLOGY | Facility: CLINIC | Age: 41
End: 2021-02-26

## 2021-03-01 ENCOUNTER — APPOINTMENT (OUTPATIENT)
Dept: INTERNAL MEDICINE | Facility: CLINIC | Age: 41
End: 2021-03-01
Payer: MEDICAID

## 2021-03-01 VITALS
TEMPERATURE: 97.4 F | WEIGHT: 164 LBS | HEIGHT: 62 IN | HEART RATE: 79 BPM | SYSTOLIC BLOOD PRESSURE: 119 MMHG | OXYGEN SATURATION: 96 % | DIASTOLIC BLOOD PRESSURE: 69 MMHG | BODY MASS INDEX: 30.18 KG/M2

## 2021-03-01 DIAGNOSIS — A04.8 OTHER SPECIFIED BACTERIAL INTESTINAL INFECTIONS: ICD-10-CM

## 2021-03-01 PROCEDURE — 99213 OFFICE O/P EST LOW 20 MIN: CPT | Mod: GC

## 2021-03-01 PROCEDURE — 99072 ADDL SUPL MATRL&STAF TM PHE: CPT

## 2021-03-02 ENCOUNTER — NON-APPOINTMENT (OUTPATIENT)
Age: 41
End: 2021-03-02

## 2021-03-03 LAB
ALBUMIN SERPL ELPH-MCNC: 4.6 G/DL
ALP BLD-CCNC: 49 U/L
ALT SERPL-CCNC: 18 U/L
ANA SER IF-ACNC: NEGATIVE
ANION GAP SERPL CALC-SCNC: 13 MMOL/L
AST SERPL-CCNC: 25 U/L
BILIRUB SERPL-MCNC: 0.5 MG/DL
BUN SERPL-MCNC: 12 MG/DL
CALCIUM SERPL-MCNC: 10.1 MG/DL
CHLORIDE SERPL-SCNC: 102 MMOL/L
CK SERPL-CCNC: 163 U/L
CO2 SERPL-SCNC: 24 MMOL/L
CREAT SERPL-MCNC: 0.75 MG/DL
CRP SERPL-MCNC: <0.1 MG/DL
ERYTHROCYTE [SEDIMENTATION RATE] IN BLOOD BY WESTERGREN METHOD: 4 MM/HR
GLUCOSE SERPL-MCNC: 89 MG/DL
POTASSIUM SERPL-SCNC: 4 MMOL/L
PROT SERPL-MCNC: 7.1 G/DL
SODIUM SERPL-SCNC: 139 MMOL/L
TSH SERPL-ACNC: 4.87 UIU/ML
UREA BREATH TEST QL: NEGATIVE

## 2021-03-16 ENCOUNTER — APPOINTMENT (OUTPATIENT)
Dept: INTERNAL MEDICINE | Facility: CLINIC | Age: 41
End: 2021-03-16
Payer: MEDICAID

## 2021-03-16 DIAGNOSIS — R93.7 ABNORMAL FINDINGS ON DIAGNOSTIC IMAGING OF OTHER PARTS OF MUSCULOSKELETAL SYSTEM: ICD-10-CM

## 2021-03-16 PROCEDURE — 99214 OFFICE O/P EST MOD 30 MIN: CPT | Mod: GC,95

## 2021-03-17 NOTE — ASSESSMENT
[FreeTextEntry1] : Patient is a 40 year old female with a pmhx of HTN, HLD, arthalgias/myalgias, who presents to  for f/u after recent imaging study of neck. \par \par #arthralgias/mylagias\par Patient has history of pain in her neck/shoulders, often associated with swelling of her hands. She had initially been given a referral for rheumatology, however given findings of cervical instability patient would be better served following up with neurosurgery prior.\par -referral given for neurosurgery\par -pending neurosurgery recommendations, can see rheumatology afterwards\par \par #pain in axilla\par Patient reports "ganglion" in L axilla, however given telehealth visit unable to properly examine the site. Breast malignancy must be ruled out. Possibly abscess that has not fully healed given repeated non-sterile lancing\par -f/u ultrasound of axilla\par -f/u screening mammogram\par -rtc after results from imaging have returned

## 2021-03-17 NOTE — END OF VISIT
[] : Resident [FreeTextEntry3] : Patient to obtain imaging of axilla and mammogram then follow up in-office\par \par Reviewed CXR and given symptoms/instability neurosurgery to evaluate\par \par

## 2021-03-17 NOTE — HISTORY OF PRESENT ILLNESS
[Home] : at home, [unfilled] , at the time of the visit. [Medical Office: ([facility])___] : at the medical office located in  [Verbal consent obtained from patient] : the patient, [unfilled] [FreeTextEntry1] : f/u [de-identified] : Discussed with patient: You have chosen to receive care through the use of tele-media. Tele-media enables health care providers at different locations to provide safe, effective and convenient care through the use of technology. Please note that this is a billable encounter. As with any health care service, there are risks associated with the use of tele-media, including equipment failure, poor image and/or resolution, and  issues. You understand that I cannot physically examine you and that you may need to come to the clinic to complete the assessment. Patient agreed verbally to understanding the risks and benefits of tele-media as explained. All questions regarding tele-media encounters were answered. \par \par Patient is a 40 year old female with a pmhx of HTN, HLD, arthalgias/myalgias, who presents to  for f/u after recent imaging study of neck. 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 She had been given a referral for a rheumatologist however she has not yet made an appointment. Patient also reported that she has a "ganglion" in her L axilla that is very painful which she self lances with a sterilized needle about 2x a week. States that her symptoms improved while she was on antibiotics for H pylori infection.

## 2021-03-30 ENCOUNTER — APPOINTMENT (OUTPATIENT)
Dept: SPINE | Facility: CLINIC | Age: 41
End: 2021-03-30
Payer: MEDICAID

## 2021-03-30 ENCOUNTER — APPOINTMENT (OUTPATIENT)
Dept: SPINE | Facility: CLINIC | Age: 41
End: 2021-03-30

## 2021-03-30 VITALS
SYSTOLIC BLOOD PRESSURE: 121 MMHG | HEIGHT: 62 IN | DIASTOLIC BLOOD PRESSURE: 83 MMHG | BODY MASS INDEX: 30.55 KG/M2 | RESPIRATION RATE: 18 BRPM | HEART RATE: 103 BPM | WEIGHT: 166 LBS | TEMPERATURE: 98 F | OXYGEN SATURATION: 97 %

## 2021-03-30 DIAGNOSIS — E78.00 PURE HYPERCHOLESTEROLEMIA, UNSPECIFIED: ICD-10-CM

## 2021-03-30 DIAGNOSIS — I10 ESSENTIAL (PRIMARY) HYPERTENSION: ICD-10-CM

## 2021-03-30 DIAGNOSIS — Z87.39 PERSONAL HISTORY OF OTHER DISEASES OF THE MUSCULOSKELETAL SYSTEM AND CONNECTIVE TISSUE: ICD-10-CM

## 2021-03-30 PROCEDURE — 99072 ADDL SUPL MATRL&STAF TM PHE: CPT

## 2021-03-30 PROCEDURE — 99204 OFFICE O/P NEW MOD 45 MIN: CPT

## 2021-03-31 PROBLEM — E78.00 HIGH CHOLESTEROL: Status: RESOLVED | Noted: 2021-03-31 | Resolved: 2021-03-31

## 2021-03-31 PROBLEM — I10 HYPERTENSION: Status: RESOLVED | Noted: 2021-03-31 | Resolved: 2021-03-31

## 2021-03-31 PROBLEM — Z87.39 HISTORY OF JOINT PAIN: Status: RESOLVED | Noted: 2021-03-31 | Resolved: 2021-03-31

## 2021-03-31 NOTE — REASON FOR VISIT
[Initial Consultation] : an initial consultation for [Friend] : friend [Patient Declined  Services] : - None: Patient declined  services [FreeTextEntry2] : referred by PCP (Dr. Hudson) [FreeTextEntry3] : Patient prefers her friend to translate [TWNoteComboBox1] : Ukrainian

## 2021-03-31 NOTE — REASON FOR VISIT
[Initial Consultation] : an initial consultation for [Friend] : friend [Patient Declined  Services] : - None: Patient declined  services [FreeTextEntry2] : referred by PCP (Dr. Hudson) [FreeTextEntry3] : Patient prefers her friend to translate [TWNoteComboBox1] : [place]

## 2021-03-31 NOTE — PHYSICAL EXAM
[Normal] : Gait: normal [] : Motor: [UE Motor Strength NL] : Motor strength of the bilateral upper extremities is normal [LE Motor Strength NL] : Motor strength of the bilateral lower extremities was normal [3+] : left ankle jerk 3+ [de-identified] : Xray cervical AP/Lat/F/E on 2/26/2021 @ [facility] showed multilvel degenerative disc disease most prominenet @ C4-7 and C2-4 grade 1 anterolisthesis with mild motion dynamic.\par MRI C-spine wo 3/31/2020 @ [facility] showed mild multilevel DDD without cord compression. There is hyperintense cord signal changes through out cervical spinal cord.

## 2021-03-31 NOTE — HISTORY OF PRESENT ILLNESS
[de-identified] : Patient is a 39 yo RH F who was referred by PCP for recent xray cervical spine finding of multilevel spondylolisthesis with dynamic instability. \par She reports progressively worsening chronic neck pain for over 6 years initially started without significant injury/trauma. Pain is described as moderate to severe sharp pain on her posterior neck radiating to b/l shoulders and intermittent numbness on b/l hands. Pain is worsening by shoulder/arm/neck movement and alleviated by PT/medications (NSAIDs, muscle relaxant). She denies weakness on upper extremities, balance problem, BB dysfunction. \par She ambulates without assistive device.

## 2021-03-31 NOTE — REASON FOR VISIT
[Initial Consultation] : an initial consultation for [Friend] : friend [Patient Declined  Services] : - None: Patient declined  services [FreeTextEntry2] : referred by PCP (Dr. Hudson) [FreeTextEntry3] : Patient prefers her friend to translate [TWNoteComboBox1] : Russian

## 2021-03-31 NOTE — PHYSICAL EXAM
[Normal] : Gait: normal [] : Motor: [UE Motor Strength NL] : Motor strength of the bilateral upper extremities is normal [LE Motor Strength NL] : Motor strength of the bilateral lower extremities was normal [3+] : left ankle jerk 3+ [de-identified] : Xray cervical AP/Lat/F/E on 2/26/2021 @ [facility] showed multilvel degenerative disc disease most prominenet @ C4-7 and C2-4 grade 1 anterolisthesis with mild motion dynamic.\par MRI C-spine wo 3/31/2020 @ [facility] showed mild multilevel DDD without cord compression. There is hyperintense cord signal changes through out cervical spinal cord.

## 2021-03-31 NOTE — PHYSICAL EXAM
[Normal] : Gait: normal [UE Motor Strength NL] : Motor strength of the bilateral upper extremities is normal [] : Motor: [LE Motor Strength NL] : Motor strength of the bilateral lower extremities was normal [3+] : left ankle jerk 3+ [de-identified] : Xray cervical AP/Lat/F/E on 2/26/2021 @ [facility] showed multilvel degenerative disc disease most prominenet @ C4-7 and C2-4 grade 1 anterolisthesis with mild motion dynamic.\par MRI C-spine wo 3/31/2020 @ [facility] showed mild multilevel DDD without cord compression. There is hyperintense cord signal changes through out cervical spinal cord.

## 2021-03-31 NOTE — HISTORY OF PRESENT ILLNESS
[de-identified] : Patient is a 39 yo RH F who was referred by PCP for recent xray cervical spine finding of multilevel spondylolisthesis with dynamic instability. \par She reports progressively worsening chronic neck pain for over 6 years initially started without significant injury/trauma. Pain is described as moderate to severe sharp pain on her posterior neck radiating to b/l shoulders and intermittent numbness on b/l hands. Pain is worsening by shoulder/arm/neck movement and alleviated by PT/medications (NSAIDs, muscle relaxant). She denies weakness on upper extremities, balance problem, BB dysfunction. \par She ambulates without assistive device.

## 2021-03-31 NOTE — ASSESSMENT
[FreeTextEntry1] : PLAN\par - EMG UE\par - continue PT/pain management for neck pain\par - RTC after EMG to see Dr. Sullivan (? myelomalacia)

## 2021-03-31 NOTE — HISTORY OF PRESENT ILLNESS
[de-identified] : Patient is a 39 yo RH F who was referred by PCP for recent xray cervical spine finding of multilevel spondylolisthesis with dynamic instability. \par She reports progressively worsening chronic neck pain for over 6 years initially started without significant injury/trauma. Pain is described as moderate to severe sharp pain on her posterior neck radiating to b/l shoulders and intermittent numbness on b/l hands. Pain is worsening by shoulder/arm/neck movement and alleviated by PT/medications (NSAIDs, muscle relaxant). She denies weakness on upper extremities, balance problem, BB dysfunction. \par She ambulates without assistive device.

## 2021-03-31 NOTE — HISTORY OF PRESENT ILLNESS
[de-identified] : Patient is a 41 yo RH F who was referred by PCP for recent xray cervical spine finding of multilevel spondylolisthesis with dynamic instability. \par She reports progressively worsening chronic neck pain for over 6 years initially started without significant injury/trauma. Pain is described as moderate to severe sharp pain on her posterior neck radiating to b/l shoulders and intermittent numbness on b/l hands. Pain is worsening by shoulder/arm/neck movement and alleviated by PT/medications (NSAIDs, muscle relaxant). She denies weakness on upper extremities, balance problem, BB dysfunction. \par She ambulates without assistive device.

## 2021-03-31 NOTE — REASON FOR VISIT
[Initial Consultation] : an initial consultation for [Friend] : friend [Patient Declined  Services] : - None: Patient declined  services [FreeTextEntry2] : referred by PCP (Dr. Hudson) [FreeTextEntry3] : Patient prefers her friend to translate [TWNoteComboBox1] : Cuban

## 2021-04-13 ENCOUNTER — RESULT REVIEW (OUTPATIENT)
Age: 41
End: 2021-04-13

## 2021-04-13 ENCOUNTER — APPOINTMENT (OUTPATIENT)
Dept: ULTRASOUND IMAGING | Facility: HOSPITAL | Age: 41
End: 2021-04-13
Payer: MEDICAID

## 2021-04-13 ENCOUNTER — APPOINTMENT (OUTPATIENT)
Dept: MAMMOGRAPHY | Facility: HOSPITAL | Age: 41
End: 2021-04-13
Payer: MEDICAID

## 2021-04-13 ENCOUNTER — OUTPATIENT (OUTPATIENT)
Dept: OUTPATIENT SERVICES | Facility: HOSPITAL | Age: 41
LOS: 1 days | End: 2021-04-13
Payer: MEDICAID

## 2021-04-13 PROCEDURE — 77063 BREAST TOMOSYNTHESIS BI: CPT | Mod: 26

## 2021-04-13 PROCEDURE — 77067 SCR MAMMO BI INCL CAD: CPT

## 2021-04-13 PROCEDURE — 76641 ULTRASOUND BREAST COMPLETE: CPT | Mod: 26,50

## 2021-04-13 PROCEDURE — 77063 BREAST TOMOSYNTHESIS BI: CPT

## 2021-04-13 PROCEDURE — 77067 SCR MAMMO BI INCL CAD: CPT | Mod: 26

## 2021-04-13 PROCEDURE — 76641 ULTRASOUND BREAST COMPLETE: CPT

## 2021-04-14 DIAGNOSIS — R92.8 OTHER ABNORMAL AND INCONCLUSIVE FINDINGS ON DIAGNOSTIC IMAGING OF BREAST: ICD-10-CM

## 2021-04-20 ENCOUNTER — RESULT REVIEW (OUTPATIENT)
Age: 41
End: 2021-04-20

## 2021-04-20 PROCEDURE — 88341 IMHCHEM/IMCYTCHM EA ADD ANTB: CPT | Mod: 26

## 2021-04-20 PROCEDURE — 88342 IMHCHEM/IMCYTCHM 1ST ANTB: CPT | Mod: 26

## 2021-04-20 PROCEDURE — 88305 TISSUE EXAM BY PATHOLOGIST: CPT | Mod: 26

## 2021-04-21 ENCOUNTER — OUTPATIENT (OUTPATIENT)
Dept: OUTPATIENT SERVICES | Facility: HOSPITAL | Age: 41
LOS: 1 days | End: 2021-04-21
Payer: MEDICAID

## 2021-04-21 ENCOUNTER — RESULT REVIEW (OUTPATIENT)
Age: 41
End: 2021-04-21

## 2021-04-21 ENCOUNTER — APPOINTMENT (OUTPATIENT)
Dept: ULTRASOUND IMAGING | Facility: HOSPITAL | Age: 41
End: 2021-04-21
Payer: MEDICAID

## 2021-04-21 PROCEDURE — 88341 IMHCHEM/IMCYTCHM EA ADD ANTB: CPT

## 2021-04-21 PROCEDURE — A4648: CPT

## 2021-04-21 PROCEDURE — 19083 BX BREAST 1ST LESION US IMAG: CPT

## 2021-04-21 PROCEDURE — 77065 DX MAMMO INCL CAD UNI: CPT

## 2021-04-21 PROCEDURE — 77065 DX MAMMO INCL CAD UNI: CPT | Mod: 26,RT

## 2021-04-21 PROCEDURE — 88305 TISSUE EXAM BY PATHOLOGIST: CPT

## 2021-04-21 PROCEDURE — 88342 IMHCHEM/IMCYTCHM 1ST ANTB: CPT

## 2021-04-21 PROCEDURE — 19083 BX BREAST 1ST LESION US IMAG: CPT | Mod: RT

## 2021-04-23 LAB — SURGICAL PATHOLOGY STUDY: SIGNIFICANT CHANGE UP

## 2021-04-28 DIAGNOSIS — R92.8 OTHER ABNORMAL AND INCONCLUSIVE FINDINGS ON DIAGNOSTIC IMAGING OF BREAST: ICD-10-CM

## 2021-04-28 DIAGNOSIS — N63.12 UNSPECIFIED LUMP IN THE RIGHT BREAST, UPPER INNER QUADRANT: ICD-10-CM

## 2021-04-29 ENCOUNTER — APPOINTMENT (OUTPATIENT)
Dept: INTERNAL MEDICINE | Facility: CLINIC | Age: 41
End: 2021-04-29
Payer: MEDICAID

## 2021-04-29 PROCEDURE — 99214 OFFICE O/P EST MOD 30 MIN: CPT | Mod: GC

## 2021-04-29 PROCEDURE — 99072 ADDL SUPL MATRL&STAF TM PHE: CPT

## 2021-04-29 NOTE — PHYSICAL EXAM
[Normal] : affect was normal and insight and judgment were intact [de-identified] : 1cm pustule lesion in L axilla, no induration, erythema surrounded

## 2021-04-29 NOTE — ASSESSMENT
[FreeTextEntry1] : Ms. Caballero is a 40F w/ PMH of HTN, HLD, arthralgias/myalgias, who presents today for follow up results from an ultrasound guided breast biopsy.\par \par #fibroadenoma\par Patient counseled that the result of her breast biopsy showed a fibroadenoma. Patient counseled that she should still follow with regular mammograms.\par -no additional workup required\par \par #r/o abscess of axilla\par Patient has recurrent pus filled area in axilla that she has consistently been self-lancing with a sterilized needle. DDx can include recurrent abscess vs infected cyst vs ingrown hair.  \par -referral given for dermatology for excision of lesion\par -if does not improve will consider ultrasound of axilla\par \par #abnormal pap smear\par Patient reports history of abnormal pap smear that she was not able to follow up on due to covid-19 pandemic\par -referral given for gynecology\par \par #HCM\par -HIV: negative 2018\par -Pap: history of abnormal pap that she was not able to follow up with, referral given to gyn today 11/2020\par -Flu shot: will receive today\par -Tdap: received in 2018.

## 2021-04-29 NOTE — END OF VISIT
[] : Resident [FreeTextEntry3] : 40 year old Ukrainian speaking female presenting for f/u of R breast biopsy- discussed benign result (fibroadenoma). Pt also reports L axillary mass x 2 years which intermittently swells and she drains w/ sterilized needle (last drained prurulent material 2 days ago). Has h/o abnormal pap, was planned for bx (presumably colpo) by GYN 2020, but HTN at time of appointment and it was deferred. She has not f/u with GYN since due to COVID pandemic. On exam, well appearing young female NAD. Focused skin exam L axilla: ~1cm superficial erosion L axilla w/o drainage or bleeding, surrounding area is tender to palpation w/o palpable mass, warmth or fluctuance. Surrounding skin is hyperpigmented. \par -Recurrent L axillary abscess: No cellulitis or drainable collection noted on exam today. Referred to derm for further eval\par -Fibroadenoma R breast. Discussed benign result\par -Abnormal pap. Unknown cytology and HPV status, but per hx was due for colposcopy in early 2020, referred to GYN. \par RTC 2-3 months for f/u above or earlier prn.

## 2021-04-29 NOTE — HISTORY OF PRESENT ILLNESS
[FreeTextEntry1] : follow up breast bx [de-identified] : Ms. Caballero is a 40F w/ PMH of HTN, HLD, arthralgias/myalgias, who presents to [facility] to discuss results from a recent ultrasound guided biopsy of a breast mass. Patient recently had mammogram that showed BIRADS 4B and subsequent ultrasound guided biopsy. Pathology results today showed results of fibroadenoma. \par \par Patient also continues to report pain in her "infected lymph node" present in her L axilla. Patient has had continued pain in this area and has previously reported that the area will become red and inflamed with a central lesion that will grow until she will self carolann it with a sterilized needle. Mammography of that breast did not show any abnormalities. Patient had previously been ordered for an ultrasound of the axilla however she was not able to receive it.

## 2021-06-01 VITALS
HEART RATE: 110 BPM | OXYGEN SATURATION: 100 % | RESPIRATION RATE: 30 BRPM | DIASTOLIC BLOOD PRESSURE: 83 MMHG | TEMPERATURE: 98 F | SYSTOLIC BLOOD PRESSURE: 143 MMHG

## 2021-06-01 DIAGNOSIS — R55 SYNCOPE AND COLLAPSE: ICD-10-CM

## 2021-06-13 NOTE — PHYSICAL EXAM
[Normal] : Gait: normal [] : Motor: [UE Motor Strength NL] : Motor strength of the bilateral upper extremities is normal [LE Motor Strength NL] : Motor strength of the bilateral lower extremities was normal [3+] : left ankle jerk 3+ [de-identified] : Xray cervical AP/Lat/F/E on 2/26/2021 @ [facility] showed multilvel degenerative disc disease most prominenet @ C4-7 and C2-4 grade 1 anterolisthesis with mild motion dynamic.\par MRI C-spine wo 3/31/2020 @ [facility] showed mild multilevel DDD without cord compression. There is hyperintense cord signal changes through out cervical spinal cord. no

## 2021-06-17 ENCOUNTER — APPOINTMENT (OUTPATIENT)
Dept: NEUROLOGY | Facility: CLINIC | Age: 41
End: 2021-06-17
Payer: MEDICAID

## 2021-06-17 VITALS
BODY MASS INDEX: 30.55 KG/M2 | HEIGHT: 62 IN | OXYGEN SATURATION: 95 % | SYSTOLIC BLOOD PRESSURE: 125 MMHG | WEIGHT: 166 LBS | HEART RATE: 87 BPM | DIASTOLIC BLOOD PRESSURE: 85 MMHG | TEMPERATURE: 98 F

## 2021-06-17 DIAGNOSIS — M54.2 CERVICALGIA: ICD-10-CM

## 2021-06-17 DIAGNOSIS — G56.03 CARPAL TUNNEL SYNDROM,BILATERAL UPPER LIMBS: ICD-10-CM

## 2021-06-17 DIAGNOSIS — G44.209 TENSION-TYPE HEADACHE, UNSPECIFIED, NOT INTRACTABLE: ICD-10-CM

## 2021-06-17 PROCEDURE — 95911 NRV CNDJ TEST 9-10 STUDIES: CPT

## 2021-06-17 PROCEDURE — 99205 OFFICE O/P NEW HI 60 MIN: CPT

## 2021-06-17 PROCEDURE — 95885 MUSC TST DONE W/NERV TST LIM: CPT | Mod: 59

## 2021-06-17 PROCEDURE — 95886 MUSC TEST DONE W/N TEST COMP: CPT

## 2021-06-17 NOTE — CONSULT LETTER
[Dear  ___] : Dear  [unfilled], [Consult Letter:] : I had the pleasure of evaluating your patient, [unfilled]. [Please see my note below.] : Please see my note below. [Consult Closing:] : Thank you very much for allowing me to participate in the care of this patient.  If you have any questions, please do not hesitate to contact me. [Sincerely,] : Sincerely, [FreeTextEntry3] : Obed Zepeda M.D.\par Neurology, Electromyography and Neuromuscular Medicine\par [facility]\par \par  of Neurology\par [facility] [employer]

## 2021-06-17 NOTE — PHYSICAL EXAM
[FreeTextEntry1] : Motor: variable effort and give-way resistance on strength testing in right arm and leg, with encouragement gives full or close to full strength; left side normal\par Sensory: LT/PP intact, tinel's sign equivocal at wrists\par Reflexes: 2+ symmetric\par MSK: severe tenderness of b/l upper trapezius and cervical paraspinals

## 2021-06-17 NOTE — ASSESSMENT
[FreeTextEntry1] : Mild bilateral carpal tunnel syndrome - likely cause of hand numbness (worse at night, not myelopathic on exam)\par No evidence of cervical radiculopathy on EMG\par Although some of her neck pain may be due to the disc herniation(s) seen on MRI, I suspect she may also have fibromyalgia - severe diffuse pain along with give-way weakness of right side\par Headaches likely tension / cervicogenic\par \par f/u with pain management, Dr. Sullivan\par consider cymbalta or lyrica for diffuse pain \par \par return as needed\par \par See separate procedure note for full results of study.

## 2021-06-17 NOTE — PROCEDURE
[FreeTextEntry1] : \par Nerve Conduction and Electromyography Report [FreeTextEntry3] : \par Electro Physiologic Findings:\par \par Limb temperature was monitored and maintained at approximately 32 – 36° C in the upper extremities.\par \par The median sensory and mixed nerve responses were slow across the wrists bilaterally, compared to the distal sensory short segments. The median motor responses were normal bilaterally and symmetric. The lumbrical studies were positive bilaterally. \par \par Needle electromyography was performed on select bilateral upper extremity C5-C8 appendicular muscles and the bilateral C5/6 paraspinals. All muscles tested were normal without evidence of active or chronic denervation. \par \par Clinical Electrophysiological Impression: \par \par This electrodiagnostic study demonstrated evidence of mild bilateral median nerve entrapments at the wrists. \par \par There was no evidence of upper extremity polyneuropathy or cervical radiculopathy on either side.

## 2021-06-17 NOTE — CONSULT LETTER
[Dear  ___] : Dear  [unfilled], [Consult Letter:] : I had the pleasure of evaluating your patient, [unfilled]. [Please see my note below.] : Please see my note below. [Consult Closing:] : Thank you very much for allowing me to participate in the care of this patient.  If you have any questions, please do not hesitate to contact me. [Sincerely,] : Sincerely, [FreeTextEntry3] : Obed Zepeda M.D.\par Neurology, Electromyography and Neuromuscular Medicine\par [facility]\par \par  of Neurology\par [facility] [employer]

## 2021-06-17 NOTE — HISTORY OF PRESENT ILLNESS
[FreeTextEntry1] : Referred by Dr. Sullivan for pain in neck and numbness in hands. \par She describes numbness in hands, arms and legs, worse at night when sleeping \par Symptoms started \par She also has pain in her back and arms / shoulders, headache \par \par Reviewed:\par Notes from neurosurgery, internal medicine \par Labs - CK, CRP normal; TSH 4.87\par \par Personally reviewed and interpreted:\par MRI C spine 2020 - Disc herniation at C3/4 indenting the central cord without signal change\par x-ray C spine 2021 - grade 1 anterolisthesis with mild instability at C2/3 and C3/4

## 2021-06-22 ENCOUNTER — APPOINTMENT (OUTPATIENT)
Dept: SPINE | Facility: CLINIC | Age: 41
End: 2021-06-22
Payer: MEDICAID

## 2021-06-22 VITALS
WEIGHT: 166 LBS | RESPIRATION RATE: 16 BRPM | SYSTOLIC BLOOD PRESSURE: 115 MMHG | OXYGEN SATURATION: 98 % | DIASTOLIC BLOOD PRESSURE: 82 MMHG | HEART RATE: 85 BPM | HEIGHT: 62 IN | TEMPERATURE: 98 F | BODY MASS INDEX: 30.55 KG/M2

## 2021-06-22 DIAGNOSIS — M54.2 CERVICALGIA: ICD-10-CM

## 2021-06-22 PROCEDURE — 99215 OFFICE O/P EST HI 40 MIN: CPT

## 2021-06-22 NOTE — DISCUSSION/SUMMARY
[de-identified] : I concur with Dr. Zepeda that I think that much of her symptoms could be explained by carpal tunnel syndrome as well as fibromyalgia.  Given her medical history I think it would be good for her to see rheumatologist and she is already seeing the pain management doctor.  I recommend that she consider and continue conservative therapies.  I do not recommend surgery at this time.  We discussed the natural history of cervical myelopathy as well as cervical and lumbar spondylosis I answered all of her questions and her friend's questions to the best my ability.  I do not prescribe any exact return date but it would recommend that she come back to see us at some point for new imaging in the next year or 2.

## 2021-06-22 NOTE — REASON FOR VISIT
[Follow-Up Visit] : a follow-up visit for [Herniated Discs] : herniated discs [Neck Pain] : neck pain

## 2021-06-22 NOTE — HISTORY OF PRESENT ILLNESS
[de-identified] : The patient returns today for review of her imaging and her recent studies.  She saw Dr. Zepeda she says she feels like her symptoms are the same if not a little bit worse

## 2021-08-05 ENCOUNTER — APPOINTMENT (OUTPATIENT)
Dept: RHEUMATOLOGY | Facility: CLINIC | Age: 41
End: 2021-08-05
Payer: MEDICAID

## 2021-08-05 VITALS
DIASTOLIC BLOOD PRESSURE: 80 MMHG | SYSTOLIC BLOOD PRESSURE: 119 MMHG | OXYGEN SATURATION: 97 % | WEIGHT: 167 LBS | HEART RATE: 96 BPM | TEMPERATURE: 97.9 F | HEIGHT: 62 IN | BODY MASS INDEX: 30.73 KG/M2

## 2021-08-05 DIAGNOSIS — R94.6 ABNORMAL RESULTS OF THYROID FUNCTION STUDIES: ICD-10-CM

## 2021-08-05 DIAGNOSIS — M25.50 PAIN IN UNSPECIFIED JOINT: ICD-10-CM

## 2021-08-05 DIAGNOSIS — R10.13 EPIGASTRIC PAIN: ICD-10-CM

## 2021-08-05 DIAGNOSIS — Z11.59 ENCOUNTER FOR SCREENING FOR OTHER VIRAL DISEASES: ICD-10-CM

## 2021-08-05 PROCEDURE — 99205 OFFICE O/P NEW HI 60 MIN: CPT | Mod: 25

## 2021-08-08 NOTE — HISTORY OF PRESENT ILLNESS
[Fatigue] : fatigue [Dry Mouth] : dry mouth [Shortness of Breath] : shortness of breath [Chest Pain] : chest pain [Arthralgias] : arthralgias [Muscle Spasms] : muscle spasms [Muscle Cramping] : muscle cramping [Joint Swelling] : joint swelling [Morning Stiffness] : morning stiffness [Myalgias] : myalgias [FreeTextEntry1] : Referred by Dr. Sullivan for Rheumatology consultation \par \par \par 39 y/o F with HTN, HLD, multi level disc herniation at C spine level and Xray done on 02/26/21 Evidence of dynamic instability at C2-3 and C3-4 levels.MRI C-spine wo 3/31/2020 @ [facility] showed mild multilevel DDD without cord compression. There is hyperintense cord signal changes through out cervical spinal cord- MRI no mention about instability or inflammatory pannus \par She was evaluated by NeuroSx for cervical myelopathy as well as cervical and lumbar spondylosis - no surgical intervention recommended and instead PT and pain management suggested. Saw Pain management and had steroid injection with only short term pain relief. \par Saw Neurology  diagnosed with Mild bilateral carpal tunnel syndrome - likely cause of hand numbness (worse at night)\par No evidence of cervical radiculopathy on EMG. Suspected Fibromyalgia and recommended meds, but pt stated has not tried any medications. Headaches likely tension / cervicogenic\par \par She reports polyarthralgia affecting multiple joints and muscle aches, \par arthralgia of the  hands, elbows, neck and shoulders,AM stiffness for few hours , drop things, trouble making the fist. b/l hand numbness and tingling. \par She has tried meloxicam did not help  and cyclobenzaprine made her drowsy. \par has 9  and 3 year old daughter, 3 year daughter with autism and she is the one who takes care of her. \par ALEXIS negative \par fatigue \par sleeping issues, wakes up middle of the night with cramps\par dry mouth at night - mouth breather. \par Low back pain - worse during sitting or standing, exercise or moving around does not really help. \par Chest pain \par \par \par No h/o  memory loss, patchy hair loss, sicca symptoms, photosensitivity, HA,  Raynaud's, oral ulcers, nasal ulcers. \par No history of pleuropericarditis \par No history of protein/hematuria \par No history of cytopenias. \par Personal or family hx of autoimmune disease, no hx of psoriasis\par  [Anorexia] : no anorexia [Weight Loss] : no weight loss [Malaise] : no malaise [Fever] : no fever [Chills] : no chills [Depression] : no depression [Malar Facial Rash] : no malar facial rash [Skin Lesions] : no lesions [Skin Nodules] : no skin nodules [Oral Ulcers] : no oral ulcers [Cough] : no cough [Dysphonia] : no dysphonia [Dysphagia] : no dysphagia [Joint Warmth] : no joint warmth [Joint Deformity] : no joint deformity [Decreased ROM] : no decreased range of motion [Difficulty Standing] : no difficulty standing [Difficulty Walking] : no difficulty walking [Dyspnea] : no dyspnea [Muscle Weakness] : no muscle weakness [Visual Changes] : no visual changes [Eye Pain] : no eye pain [Eye Redness] : no eye redness [Dry Eyes] : no dry eyes

## 2021-08-08 NOTE — REVIEW OF SYSTEMS
[Feeling Tired] : feeling tired [Dry Eyes] : dryness of the eyes [Chest Pain] : chest pain [SOB on Exertion] : shortness of breath during exertion [Arthralgias] : arthralgias [Joint Pain] : joint pain [Joint Stiffness] : joint stiffness [Sleep Disturbances] : sleep disturbances [Negative] : Neurological [Joint Swelling] : no joint swelling [Easy Bleeding] : no tendency for easy bleeding [Easy Bruising] : no tendency for easy bruising [Swollen Glands] : no swollen glands [Swollen Glands In The Neck] : no swollen glands in the neck

## 2021-08-08 NOTE — ASSESSMENT
[FreeTextEntry1] : 41 y/o F with HTN, HLD, multi level disc herniation at C spine level and Xray done on 02/26/21 Evidence of dynamic instability at C2-3 and C3-4 levels.MRI C-spine wo 3/31/2020 @ [facility] showed mild multilevel DDD without cord compression. There is hyperintense cord signal changes through out cervical spinal cord- MRI no mention about instability or inflammatory pannus \par She was evaluated by NeuroSx for cervical myelopathy as well as cervical and lumbar spondylosis - no surgical intervention recommended and instead PT and pain management suggested. Saw Pain management and had steroid injection with only short term pain relief. \par Saw Neurology  diagnosed with Mild bilateral carpal tunnel syndrome - likely cause of hand numbness (worse at night)\par No evidence of cervical radiculopathy on EMG. Suspected Fibromyalgia and recommended meds, but pt stated has not tried any medications. \par Pt has polyarthralgia, fatigue, dry eyes and mouth and insomnia,  exam without synovitis or RA specific deformities but areas of symmetric all small and large joint tenderness as well as muscle tenderness. \par Labs with normal ESR, CRP, negative ALEXIS \par RA usually affects C1-C2 and other lower C spine instability is not very common, also MRI does not mention any RA specific inflammatory pannus or instability. \par I agree with Dr. Zepeda that most likely pts majority of symptoms can be explained by Fibromyalgia, however I will check additional rheum labs such as RF, CCP and hand and wrist Xray \par for chronic low back pain: check [id number] and SI joint Xray to rule out SpA. \par \par Fibromyalgia (FM) is characterized by widespread musculoskeletal pain and fatigue, often accompanied by cognitive and psychiatric disturbances. Physical examination reveals tenderness in multiple soft tissue anatomic locations. Laboratory testing is normal in the absence of other illnesses. \par Treatment of fibromyalgia is directed at reducing the major symptoms of this disorder and includes: low-impact aerobic exercise and pharmacologic therapy. \par I suggested follow up with Neurology or pain meds about discussion of treatment options. \par Since I don’t see any MRI report scanned will check with [facility]. \par for b/l CTS: I recommended to use splint at night. \par \par Will refer to Cardiology for evaluation of chest pain/palpitation. \par \par \par f/u in 1 month to review labs \par \par

## 2021-08-08 NOTE — PHYSICAL EXAM
[General Appearance - Alert] : alert [General Appearance - In No Acute Distress] : in no acute distress [General Appearance - Well Nourished] : well nourished [General Appearance - Well Developed] : well developed [General Appearance - Well-Appearing] : healthy appearing [Sclera] : the sclera and conjunctiva were normal [Outer Ear] : the ears and nose were normal in appearance [Examination Of The Oral Cavity] : the lips and gums were normal [Neck Appearance] : the appearance of the neck was normal [Neck Cervical Mass (___cm)] : no neck mass was observed [Respiration, Rhythm And Depth] : normal respiratory rhythm and effort [Exaggerated Use Of Accessory Muscles For Inspiration] : no accessory muscle use [Auscultation Breath Sounds / Voice Sounds] : lungs were clear to auscultation bilaterally [Heart Rate And Rhythm] : heart rate was normal and rhythm regular [Heart Sounds] : normal S1 and S2 [Murmurs] : no murmurs [Heart Sounds Pericardial Friction Rub] : no pericardial rub [Abdomen Soft] : soft [Abdomen Tenderness] : non-tender [Cervical Lymph Nodes Enlarged Posterior Bilaterally] : posterior cervical [Abnormal Walk] : normal gait [Nail Clubbing] : no clubbing  or cyanosis of the fingernails [Musculoskeletal - Swelling] : no joint swelling seen [Motor Tone] : muscle strength and tone were normal [] : no rash [Skin Lesions] : no skin lesions [Motor Exam] : the motor exam was normal [Oriented To Time, Place, And Person] : oriented to person, place, and time [FreeTextEntry1] : Multiple b/l tender joints and muscles, no synovitis or joint deformities, no nodules

## 2021-08-11 LAB
A PHAGOCYTOPH IGG TITR SER IF: NORMAL TITER
ALBUPE: 21.7 %
ALPHA1UPE: 34.9 %
ALPHA2UPE: 21.8 %
B BURGDOR AB SER QL IA: NEGATIVE
B BURGDOR AB SER-IMP: NEGATIVE
B BURGDOR IGG+IGM SER QL: 0.16 INDEX
B MICROTI IGG TITR SER: NORMAL TITER
BETAUPE: 14.6 %
CCP AB SER IA-ACNC: <8 UNITS
E CHAFFEENSIS IGG TITR SER IF: NORMAL TITER
ENA SS-A AB SER IA-ACNC: <0.2 AL
ENA SS-B AB SER IA-ACNC: <0.2 AL
ENDOMYSIUM IGA SER QL: NEGATIVE
ENDOMYSIUM IGA TITR SER: NORMAL
GAMMAUPE: 7 %
HBV CORE IGG+IGM SER QL: NONREACTIVE
HBV SURFACE AB SER QL: REACTIVE
HBV SURFACE AG SER QL: NONREACTIVE
HCV AB SER QL: NONREACTIVE
HCV S/CO RATIO: 0.15 S/CO
HIV1+2 AB SPEC QL IA.RAPID: NONREACTIVE
HLA-B27 RELATED AG QL: NEGATIVE
IGA 24H UR QL IFE: NORMAL
KAPPA LC 24H UR QL: NORMAL
M TB IFN-G BLD-IMP: NEGATIVE
PROT PATTERN 24H UR ELPH-IMP: NORMAL
PROT UR-MCNC: 12 MG/DL
PROT UR-MCNC: 12 MG/DL
QUANTIFERON TB PLUS MITOGEN MINUS NIL: >10 IU/ML
QUANTIFERON TB PLUS NIL: 0.19 IU/ML
QUANTIFERON TB PLUS TB1 MINUS NIL: -0.02 IU/ML
QUANTIFERON TB PLUS TB2 MINUS NIL: -0.01 IU/ML
RF+CCP IGG SER-IMP: NEGATIVE
RHEUMATOID FACT SER QL: <10 IU/ML
T4 FREE SERPL-MCNC: 1.1 NG/DL
TSH SERPL-ACNC: 1.66 UIU/ML
TTG IGA SER IA-ACNC: 1.2 U/ML
TTG IGA SER-ACNC: NEGATIVE

## 2021-08-13 ENCOUNTER — NON-APPOINTMENT (OUTPATIENT)
Age: 41
End: 2021-08-13

## 2021-08-16 ENCOUNTER — NON-APPOINTMENT (OUTPATIENT)
Age: 41
End: 2021-08-16

## 2021-08-16 ENCOUNTER — APPOINTMENT (OUTPATIENT)
Dept: INTERNAL MEDICINE | Facility: CLINIC | Age: 41
End: 2021-08-16
Payer: MEDICAID

## 2021-08-16 VITALS
TEMPERATURE: 98.2 F | RESPIRATION RATE: 17 BRPM | SYSTOLIC BLOOD PRESSURE: 123 MMHG | BODY MASS INDEX: 30.73 KG/M2 | HEART RATE: 78 BPM | DIASTOLIC BLOOD PRESSURE: 88 MMHG | OXYGEN SATURATION: 97 % | HEIGHT: 62 IN | WEIGHT: 167 LBS

## 2021-08-16 DIAGNOSIS — M43.12 SPONDYLOLISTHESIS, CERVICAL REGION: ICD-10-CM

## 2021-08-16 DIAGNOSIS — K21.9 GASTRO-ESOPHAGEAL REFLUX DISEASE W/OUT ESOPHAGITIS: ICD-10-CM

## 2021-08-16 PROCEDURE — 99214 OFFICE O/P EST MOD 30 MIN: CPT | Mod: 25,GC

## 2021-08-19 LAB
ANION GAP SERPL CALC-SCNC: 12 MMOL/L
BUN SERPL-MCNC: 13 MG/DL
CALCIUM SERPL-MCNC: 10.5 MG/DL
CHLORIDE SERPL-SCNC: 103 MMOL/L
CO2 SERPL-SCNC: 25 MMOL/L
CREAT SERPL-MCNC: 0.74 MG/DL
GLUCOSE SERPL-MCNC: 93 MG/DL
POTASSIUM SERPL-SCNC: 4.5 MMOL/L
SODIUM SERPL-SCNC: 140 MMOL/L

## 2021-08-20 ENCOUNTER — TRANSCRIPTION ENCOUNTER (OUTPATIENT)
Age: 41
End: 2021-08-20

## 2021-08-20 DIAGNOSIS — R89.4 ABNORMAL IMMUNOLOGICAL FINDINGS IN SPECIMENS FROM OTHER ORGANS, SYSTEMS AND TISSUES: ICD-10-CM

## 2021-08-20 LAB
ALBUMIN MFR SERPL ELPH: 65.2 %
ALBUMIN SERPL-MCNC: 4.8 G/DL
ALBUMIN/GLOB SERPL: 1.9 RATIO
ALPHA1 GLOB MFR SERPL ELPH: 3 %
ALPHA1 GLOB SERPL ELPH-MCNC: 0.2 G/DL
ALPHA2 GLOB MFR SERPL ELPH: 6.4 %
ALPHA2 GLOB SERPL ELPH-MCNC: 0.5 G/DL
ANNOTATION COMMENT IMP: NORMAL
B-GLOBULIN MFR SERPL ELPH: 10.9 %
B-GLOBULIN SERPL ELPH-MCNC: 0.8 G/DL
DEPRECATED KAPPA LC FREE/LAMBDA SER: 0.96 RATIO
GAMMA GLOB FLD ELPH-MCNC: 1.1 G/DL
GAMMA GLOB MFR SERPL ELPH: 14.5 %
HLA-DQ2: POSITIVE
HLA-DQ8 QL: NEGATIVE
IGA SER QL IEP: 154 MG/DL
IGG SER QL IEP: 1094 MG/DL
IGM SER QL IEP: 178 MG/DL
INTERPRETATION SERPL IEP-IMP: NORMAL
KAPPA LC CSF-MCNC: 1.27 MG/DL
KAPPA LC SERPL-MCNC: 1.22 MG/DL
M PROTEIN SPEC IFE-MCNC: NORMAL
PROT SERPL-MCNC: 7.3 G/DL
PROT SERPL-MCNC: 7.3 G/DL
REF LAB TEST METHOD: NORMAL

## 2021-08-31 ENCOUNTER — APPOINTMENT (OUTPATIENT)
Dept: HEART AND VASCULAR | Facility: CLINIC | Age: 41
End: 2021-08-31
Payer: MEDICAID

## 2021-08-31 ENCOUNTER — NON-APPOINTMENT (OUTPATIENT)
Age: 41
End: 2021-08-31

## 2021-08-31 VITALS
SYSTOLIC BLOOD PRESSURE: 119 MMHG | DIASTOLIC BLOOD PRESSURE: 82 MMHG | BODY MASS INDEX: 31.8 KG/M2 | TEMPERATURE: 97.5 F | HEIGHT: 60 IN | WEIGHT: 162 LBS | HEART RATE: 69 BPM | OXYGEN SATURATION: 97 %

## 2021-08-31 PROCEDURE — 99204 OFFICE O/P NEW MOD 45 MIN: CPT

## 2021-08-31 PROCEDURE — 93000 ELECTROCARDIOGRAM COMPLETE: CPT

## 2021-08-31 NOTE — ASSESSMENT
[FreeTextEntry1] : Palpitations will do holter echo and sleep study \par soboe will do est\par fu pending test results

## 2021-08-31 NOTE — HISTORY OF PRESENT ILLNESS
[FreeTextEntry1] : 40 F HTN Fibromayalgia referred by Rheum for elevated heart rate and tachycardia when she walks gets into taxi she feels dizzy she is palpitations she feels agitated when she walks she has worse palpitations at night\par \par ecg nsr nsst changes\par \par 3 healthy pregnancies \par sleep apnea snores a lot wakes up a lot at night poor sleep quality \par \par  [id number]

## 2021-09-02 ENCOUNTER — TRANSCRIPTION ENCOUNTER (OUTPATIENT)
Age: 41
End: 2021-09-02

## 2021-09-02 ENCOUNTER — APPOINTMENT (OUTPATIENT)
Dept: INTERNAL MEDICINE | Facility: CLINIC | Age: 41
End: 2021-09-02

## 2021-09-03 ENCOUNTER — APPOINTMENT (OUTPATIENT)
Dept: INTERNAL MEDICINE | Facility: CLINIC | Age: 41
End: 2021-09-03
Payer: MEDICAID

## 2021-09-03 VITALS
RESPIRATION RATE: 12 BRPM | OXYGEN SATURATION: 97 % | SYSTOLIC BLOOD PRESSURE: 123 MMHG | WEIGHT: 167 LBS | DIASTOLIC BLOOD PRESSURE: 82 MMHG | BODY MASS INDEX: 32.62 KG/M2 | HEART RATE: 82 BPM

## 2021-09-03 DIAGNOSIS — L02.419 CUTANEOUS ABSCESS OF LIMB, UNSPECIFIED: ICD-10-CM

## 2021-09-03 DIAGNOSIS — E66.3 OVERWEIGHT: ICD-10-CM

## 2021-09-03 DIAGNOSIS — K90.0 CELIAC DISEASE: ICD-10-CM

## 2021-09-03 PROCEDURE — 99213 OFFICE O/P EST LOW 20 MIN: CPT

## 2021-09-03 NOTE — END OF VISIT
[FreeTextEntry3] : 39 yo femlae with hx fibromyalgia, HTN , HPL, here for f/u\par \par 1) abnormal PAP - counseled again to see gyn\par 2) axillary abscess - chronic, following with derm\par 3) HTN - controlled\par 4) depression - chronic, c/w duloxetine just started 2 weeks ago\par 5) + celiac HLA DQ2 - refer to GI per rheum

## 2021-09-03 NOTE — HISTORY OF PRESENT ILLNESS
[de-identified] : 39 y/o F w/ PMHx HTN, HLD, mild multilevel DDD at C spine and fibromyalgia who presents today with follow up of depression. Reports that her mood has been improving since beginning the medication and she has started to feel a bit better. Denies any side effects from the medication. Patient also reports that she saw rheumatology who recommended she see a GI based on abnormal lab results. She has not yet follows up with ob/gyn for her prior abnormal pap. She saw dermatology for her abnormal axillary pimple/abscess who suggested that she might need surgery however she would wait until the end of the summer. She is requesting a nutrition referral for weight loss assistance

## 2021-09-03 NOTE — ASSESSMENT
[FreeTextEntry1] : 41 y/o F w/ PMHx HTN, HLD, mild multilevel DDD at C spine and fibromyalgia who presents today with follow up of depression.\par \par #depression\par Patient currently on duloxetine 30mg, tolerating well, reporting improvement in mood\par -continue duloxetine 30mg, rtc in 4-6 weeks to consider up titrating\par \par #abnormal pap\par Patient has history of abnormal pap, previously given referral for ob/gyn, has not yet gone\par -encouraged to follow up with ob/gyn\par \par #rule out celiac\par Patient had positive HLA-DQ2 antibody, recommended by rheumatology to follow up with GI\par -referral given for GI\par \par #axillary abcess\par Patient has history of pimple/abcess in axilla with history of self-lancing at home. Referred to dermatology at recent visit. Patient states she has seen dermatology and they recommended that they may need surgery however would like to wait until the end of the summer\par -f/u with derm\par \par #Obesity\par -referral given for nutritionist

## 2021-09-09 ENCOUNTER — APPOINTMENT (OUTPATIENT)
Dept: OPHTHALMOLOGY | Facility: CLINIC | Age: 41
End: 2021-09-09

## 2021-09-23 ENCOUNTER — APPOINTMENT (OUTPATIENT)
Dept: INTERNAL MEDICINE | Facility: CLINIC | Age: 41
End: 2021-09-23

## 2021-09-23 ENCOUNTER — TRANSCRIPTION ENCOUNTER (OUTPATIENT)
Age: 41
End: 2021-09-23

## 2021-10-07 ENCOUNTER — APPOINTMENT (OUTPATIENT)
Dept: INTERNAL MEDICINE | Facility: CLINIC | Age: 41
End: 2021-10-07

## 2021-10-07 ENCOUNTER — TRANSCRIPTION ENCOUNTER (OUTPATIENT)
Age: 41
End: 2021-10-07

## 2021-11-04 ENCOUNTER — APPOINTMENT (OUTPATIENT)
Dept: RHEUMATOLOGY | Facility: CLINIC | Age: 41
End: 2021-11-04

## 2021-11-05 ENCOUNTER — APPOINTMENT (OUTPATIENT)
Dept: INTERNAL MEDICINE | Facility: CLINIC | Age: 41
End: 2021-11-05
Payer: MEDICAID

## 2021-11-05 VITALS
TEMPERATURE: 98 F | BODY MASS INDEX: 31.64 KG/M2 | DIASTOLIC BLOOD PRESSURE: 88 MMHG | HEART RATE: 90 BPM | OXYGEN SATURATION: 97 % | WEIGHT: 162 LBS | SYSTOLIC BLOOD PRESSURE: 133 MMHG

## 2021-11-05 DIAGNOSIS — M50.20 OTHER CERVICAL DISC DISPLACEMENT, UNSPECIFIED CERVICAL REGION: ICD-10-CM

## 2021-11-05 DIAGNOSIS — R42 DIZZINESS AND GIDDINESS: ICD-10-CM

## 2021-11-05 DIAGNOSIS — J06.9 ACUTE UPPER RESPIRATORY INFECTION, UNSPECIFIED: ICD-10-CM

## 2021-11-05 DIAGNOSIS — F32.A DEPRESSION, UNSPECIFIED: ICD-10-CM

## 2021-11-05 PROCEDURE — 99214 OFFICE O/P EST MOD 30 MIN: CPT | Mod: 25,GC

## 2021-11-05 RX ORDER — OMEPRAZOLE 40 MG/1
40 CAPSULE, DELAYED RELEASE ORAL DAILY
Qty: 30 | Refills: 2 | Status: DISCONTINUED | COMMUNITY
Start: 2021-11-05 | End: 2021-11-05

## 2021-11-05 RX ORDER — OMEPRAZOLE 20 MG/1
20 CAPSULE, DELAYED RELEASE ORAL DAILY
Qty: 30 | Refills: 0 | Status: DISCONTINUED | COMMUNITY
Start: 2021-08-16 | End: 2021-11-05

## 2021-11-06 ENCOUNTER — EMERGENCY (EMERGENCY)
Facility: HOSPITAL | Age: 41
LOS: 1 days | Discharge: HOME | End: 2021-11-06
Attending: EMERGENCY MEDICINE | Admitting: EMERGENCY MEDICINE
Payer: MEDICAID

## 2021-11-06 VITALS
TEMPERATURE: 98 F | OXYGEN SATURATION: 100 % | HEIGHT: 62 IN | DIASTOLIC BLOOD PRESSURE: 117 MMHG | RESPIRATION RATE: 22 BRPM | HEART RATE: 112 BPM | SYSTOLIC BLOOD PRESSURE: 169 MMHG | WEIGHT: 164.91 LBS

## 2021-11-06 VITALS
RESPIRATION RATE: 18 BRPM | HEART RATE: 82 BPM | TEMPERATURE: 98 F | DIASTOLIC BLOOD PRESSURE: 92 MMHG | OXYGEN SATURATION: 100 % | SYSTOLIC BLOOD PRESSURE: 138 MMHG

## 2021-11-06 DIAGNOSIS — J45.909 UNSPECIFIED ASTHMA, UNCOMPLICATED: ICD-10-CM

## 2021-11-06 DIAGNOSIS — M79.7 FIBROMYALGIA: ICD-10-CM

## 2021-11-06 DIAGNOSIS — F41.0 PANIC DISORDER [EPISODIC PAROXYSMAL ANXIETY]: ICD-10-CM

## 2021-11-06 DIAGNOSIS — R06.02 SHORTNESS OF BREATH: ICD-10-CM

## 2021-11-06 DIAGNOSIS — R00.0 TACHYCARDIA, UNSPECIFIED: ICD-10-CM

## 2021-11-06 LAB
ALBUMIN SERPL ELPH-MCNC: 5 G/DL — SIGNIFICANT CHANGE UP (ref 3.3–5)
ALP SERPL-CCNC: 54 U/L — SIGNIFICANT CHANGE UP (ref 40–120)
ALT FLD-CCNC: 23 U/L — SIGNIFICANT CHANGE UP (ref 10–45)
ANION GAP SERPL CALC-SCNC: 12 MMOL/L — SIGNIFICANT CHANGE UP (ref 5–17)
AST SERPL-CCNC: 27 U/L — SIGNIFICANT CHANGE UP (ref 10–40)
BASOPHILS # BLD AUTO: 0.03 K/UL — SIGNIFICANT CHANGE UP (ref 0–0.2)
BASOPHILS NFR BLD AUTO: 0.5 % — SIGNIFICANT CHANGE UP (ref 0–2)
BILIRUB SERPL-MCNC: 0.5 MG/DL — SIGNIFICANT CHANGE UP (ref 0.2–1.2)
BUN SERPL-MCNC: 10 MG/DL — SIGNIFICANT CHANGE UP (ref 7–23)
CALCIUM SERPL-MCNC: 9.8 MG/DL — SIGNIFICANT CHANGE UP (ref 8.4–10.5)
CHLORIDE SERPL-SCNC: 101 MMOL/L — SIGNIFICANT CHANGE UP (ref 96–108)
CO2 SERPL-SCNC: 25 MMOL/L — SIGNIFICANT CHANGE UP (ref 22–31)
CREAT SERPL-MCNC: 0.74 MG/DL — SIGNIFICANT CHANGE UP (ref 0.5–1.3)
EOSINOPHIL # BLD AUTO: 0.18 K/UL — SIGNIFICANT CHANGE UP (ref 0–0.5)
EOSINOPHIL NFR BLD AUTO: 3.2 % — SIGNIFICANT CHANGE UP (ref 0–6)
GLUCOSE SERPL-MCNC: 108 MG/DL — HIGH (ref 70–99)
HCG SERPL-ACNC: <0 MIU/ML — SIGNIFICANT CHANGE UP
HCT VFR BLD CALC: 36.6 % — SIGNIFICANT CHANGE UP (ref 34.5–45)
HGB BLD-MCNC: 12.4 G/DL — SIGNIFICANT CHANGE UP (ref 11.5–15.5)
IMM GRANULOCYTES NFR BLD AUTO: 0.2 % — SIGNIFICANT CHANGE UP (ref 0–1.5)
LYMPHOCYTES # BLD AUTO: 1.89 K/UL — SIGNIFICANT CHANGE UP (ref 1–3.3)
LYMPHOCYTES # BLD AUTO: 33.6 % — SIGNIFICANT CHANGE UP (ref 13–44)
MCHC RBC-ENTMCNC: 28.8 PG — SIGNIFICANT CHANGE UP (ref 27–34)
MCHC RBC-ENTMCNC: 33.9 GM/DL — SIGNIFICANT CHANGE UP (ref 32–36)
MCV RBC AUTO: 84.9 FL — SIGNIFICANT CHANGE UP (ref 80–100)
MONOCYTES # BLD AUTO: 0.44 K/UL — SIGNIFICANT CHANGE UP (ref 0–0.9)
MONOCYTES NFR BLD AUTO: 7.8 % — SIGNIFICANT CHANGE UP (ref 2–14)
NEUTROPHILS # BLD AUTO: 3.08 K/UL — SIGNIFICANT CHANGE UP (ref 1.8–7.4)
NEUTROPHILS NFR BLD AUTO: 54.7 % — SIGNIFICANT CHANGE UP (ref 43–77)
NRBC # BLD: 0 /100 WBCS — SIGNIFICANT CHANGE UP (ref 0–0)
PLATELET # BLD AUTO: 401 K/UL — HIGH (ref 150–400)
POTASSIUM SERPL-MCNC: 3.6 MMOL/L — SIGNIFICANT CHANGE UP (ref 3.5–5.3)
POTASSIUM SERPL-SCNC: 3.6 MMOL/L — SIGNIFICANT CHANGE UP (ref 3.5–5.3)
PROT SERPL-MCNC: 7.6 G/DL — SIGNIFICANT CHANGE UP (ref 6–8.3)
RBC # BLD: 4.31 M/UL — SIGNIFICANT CHANGE UP (ref 3.8–5.2)
RBC # FLD: 13.4 % — SIGNIFICANT CHANGE UP (ref 10.3–14.5)
SODIUM SERPL-SCNC: 138 MMOL/L — SIGNIFICANT CHANGE UP (ref 135–145)
WBC # BLD: 5.63 K/UL — SIGNIFICANT CHANGE UP (ref 3.8–10.5)
WBC # FLD AUTO: 5.63 K/UL — SIGNIFICANT CHANGE UP (ref 3.8–10.5)

## 2021-11-06 PROCEDURE — 36415 COLL VENOUS BLD VENIPUNCTURE: CPT

## 2021-11-06 PROCEDURE — 71045 X-RAY EXAM CHEST 1 VIEW: CPT | Mod: 26

## 2021-11-06 PROCEDURE — 84702 CHORIONIC GONADOTROPIN TEST: CPT

## 2021-11-06 PROCEDURE — 71045 X-RAY EXAM CHEST 1 VIEW: CPT

## 2021-11-06 PROCEDURE — 93005 ELECTROCARDIOGRAM TRACING: CPT

## 2021-11-06 PROCEDURE — 80053 COMPREHEN METABOLIC PANEL: CPT

## 2021-11-06 PROCEDURE — 99284 EMERGENCY DEPT VISIT MOD MDM: CPT

## 2021-11-06 PROCEDURE — 99284 EMERGENCY DEPT VISIT MOD MDM: CPT | Mod: 25

## 2021-11-06 PROCEDURE — 96360 HYDRATION IV INFUSION INIT: CPT

## 2021-11-06 PROCEDURE — 85025 COMPLETE CBC W/AUTO DIFF WBC: CPT

## 2021-11-06 RX ORDER — ALPRAZOLAM 0.25 MG
0.25 TABLET ORAL ONCE
Refills: 0 | Status: DISCONTINUED | OUTPATIENT
Start: 2021-11-06 | End: 2021-11-06

## 2021-11-06 RX ORDER — SODIUM CHLORIDE 9 MG/ML
1000 INJECTION INTRAMUSCULAR; INTRAVENOUS; SUBCUTANEOUS ONCE
Refills: 0 | Status: COMPLETED | OUTPATIENT
Start: 2021-11-06 | End: 2021-11-06

## 2021-11-06 RX ADMIN — SODIUM CHLORIDE 1000 MILLILITER(S): 9 INJECTION INTRAMUSCULAR; INTRAVENOUS; SUBCUTANEOUS at 14:52

## 2021-11-06 RX ADMIN — Medication 0.25 MILLIGRAM(S): at 14:51

## 2021-11-06 RX ADMIN — SODIUM CHLORIDE 1000 MILLILITER(S): 9 INJECTION INTRAMUSCULAR; INTRAVENOUS; SUBCUTANEOUS at 16:17

## 2021-11-06 NOTE — ED ADULT NURSE NOTE - PAIN: PRESENCE, MLM
----- Message from [facility] [phone]  ---------------------------------------------------------------------------  --------------  SCRIPT ANSWERS  Relationship to Patient?  Self
complains of pain/discomfort

## 2021-11-06 NOTE — ED PROVIDER NOTE - PATIENT PORTAL LINK FT
You can access the FollowMyHealth Patient Portal offered by [facility] by registering at the following website: [email or web address]. By joining [employer]’s FollowMyHealth portal, you will also be able to view your health information using other applications (apps) compatible with our system.

## 2021-11-06 NOTE — ED PROVIDER NOTE - OBJECTIVE STATEMENT
42 y/o f with pmh of depression anxiety, asthma, fibromylagia presents with panic attack, shortness of breath, hands tingling.  No chest pain, fever, infectious symptoms.  Hx of similar presentations in the past.

## 2021-11-06 NOTE — ED PROVIDER NOTE - NSFOLLOWUPINSTRUCTIONS_ED_ALL_ED_FT
Take your regularly prescribed medication.    Follow up with your PMD.    Return to ED with worsening symptoms or other concerns.          Panic Attack    WHAT YOU NEED TO KNOW:    A panic attack usually lasts 10 to 20 minutes and feels more serious than it is. Remind yourself that you are not in danger and that the attack will stop soon. Deep breathing can help stop a panic attack or keep it from becoming severe.    Heart Attack vs Panic Attack         DISCHARGE INSTRUCTIONS:    Call your local emergency number (911 in the ) if:   •You have any of the following signs of a heart attack: ?Squeezing, pressure, or pain in your chest      ?You may also have any of the following: ?Discomfort or pain in your back, neck, jaw, stomach, or arm      ?Shortness of breath      ?Nausea or vomiting      ?Lightheadedness or a sudden cold sweat            Call your doctor or therapist if:   •You have new or worsening panic attacks after treatment.      •You have questions or concerns about your condition or care.      Medicines:   •Medicines may be given to make you feel more relaxed or to reduce anxiety that causes a panic attack. Some medicines are taken only when you are having a panic attack. Other medicines can be taken to prevent panic attacks.      •Take your medicine as directed. Contact your healthcare provider if you think your medicine is not helping or if you have side effects. Tell him of her if you are allergic to any medicine. Keep a list of the medicines, vitamins, and herbs you take. Include the amounts, and when and why you take them. Bring the list or the pill bottles to follow-up visits. Carry your medicine list with you in case of an emergency.      Manage or prevent a panic attack:   •Manage stress. Stress can trigger a panic attack. Ways to lower your stress level include yoga, meditation, and talking to someone about the stress in your life.      •Exercise as directed. Exercise can reduce stress and help you sleep better. Try to get at least 30 minutes of physical activity on most days of the week. Your healthcare provider can help you create an exercise plan.  Walking for Exercise           •Set a sleep schedule. Too little sleep can increase anxiety. Go to bed at the same time each night and wake up at the same time each morning. Keep your room quiet and free from distractions, such as a television or computer.      •Eat a variety of healthy foods. Healthy foods include fruits, vegetables, low-fat dairy products, lean meats, fish, and beans. Limit sugar. Sugar can increase your symptoms.  Healthy Foods           •Do not have foods or drinks that contain caffeine. These include coffee, tea, soda, energy drinks, and chocolate. Caffeine can make anxiety worse or trigger a panic attack.      •Limit alcohol. You may think alcohol makes you calmer, but it is not a safe or effective way to control anxiety. Alcohol can increase anxiety if you drink large amounts or drink often. Ask your healthcare provider how much alcohol is safe for you to drink. A drink of alcohol is 12 ounces of beer, 5 ounces of wine, or 1½ ounces of liquor.      •Do not smoke. Nicotine and other chemicals in cigarettes and cigars can increase anxiety. Ask your healthcare provider for information if you currently smoke and need help to quit. E-cigarettes or smokeless tobacco still contain nicotine. Talk to your healthcare provider before you use these products.      Follow up with your doctor or therapist as directed: Write down your questions so you remember to ask them during your visits.       © Copyright [employer] 2021           back to top                          © Copyright [employer] 2021 Take your regularly prescribed medication.    Follow up with your PMD.    Return to ED with worsening symptoms or other concerns.      Ataque de pánico      Un ataque de pánico es cuando de repente se siente muy asustado, incómodo o nervioso (ansioso). Un ataque de pánico puede ocurrir cuando está asustado o sin motivo.    Un ataque de pánico puede parecer un problema grave. Incluso puede sentirse jerman un ataque cardíaco o un derrame cerebral. Consulte a meraz médico cuando tenga un ataque de pánico para asegurarse de que no tenga un problema grave.      Siga estas instrucciones en casa:     • [place] los medicamentos únicamente según las indicaciones de meraz médico.      • Si se siente preocupado o nervioso, trate de no lalito cafeína.    • Cuide kelechi meraz douglas. Para hacer esto:  •Come phyllis. Asegúrese de comer frutas y verduras frescas, cereales integrales, ze magras y productos lácteos bajos en grasa.      •Dormir lo suficiente. Trate de dormir de 7 a 8 horas cada noche.      •Ejercicio. Intente estar activo triston 30 minutos 5 o más días a la semana.      •No fume. Hable con meraz médico si necesita ayuda para dejar de fumar.    • Limite la cantidad de alcohol que yanci:  • Si es elidia donna que no está embarazada: trate de no lalito más de 1 bebida al día.      • Si es hombre: trate de no lalito más de 2 tragos al día.      • Elidia bebida equivale a 12 oz de cerveza, 5 oz de vino o 1½ oz de licor rohit.          • Asista a todas las visitas de seguimiento que le indique meraz médico. [place] es importante.        Comuníquese con un médico si:    • Negra síntomas no mejoran.      • Negra síntomas empeoran.      • No puede lalito negra medicamentos jerman le indicaron.        Obtenga ayuda de inmediato si:    • Tiene pensamientos de hacerse daño a sí mismo oa otros.      • Tiene síntomas de un ataque de pánico. No conduzca usted mismo al hospital. Pídale a otra persona que lo lleve o llame a elidia ambulancia.    Si siente que puede lastimarse a sí mismo oa otros, o tiene pensamientos de quitarse la castillo, busque ayuda de inmediato. Puede acudir al servicio de urgencias más cercano o llamar al:  • Los servicios de emergencia locales (911 en . [place].).      • Elidia línea de ayuda para crisis de suicidio, jerman la Línea Nacional de Prevención del Suicidio al [phone]. Dolores está abierto las 24 horas del día.        Resumen    • Un ataque de pánico es cuando de repente se siente muy asustado, incómodo o nervioso (ansioso).      • Consulte a meraz médico cuando tenga un ataque de pánico para asegurarse de que no tenga otro problema grave.      • Si siente que puede lastimarse a sí mismo oa otros, obtenga ayuda de inmediato llamando al 911.      Esta información no reemplaza los consejos que le haya dado meraz proveedor de atención médica. Asegúrese de discutir cualquier pregunta que tenga con meraz proveedor de atención médica.      Documento revisado: 17/06/2021 Documento revisado: 17/06/2021

## 2021-11-06 NOTE — ED ADULT NURSE NOTE - OBJECTIVE STATEMENT
Pt originally presented to ER d/t increasing left knee pain , SOB + dizziness and feeling as though her BP was elevated. Pt began having "hand cramps", hyperventilating, CP + palpitations. Pt upgraded.  In triage pt tachypneic, tachycardic to 140, and ill-appearing. MD at bedside, pt instructed to take slow deep breaths. pt reports having started some new medications for the first time. Pt lungs clear, breathing easier, appears to be improving. Pt reports hx of panic attacks and anxiety although she believes she does not.

## 2021-11-06 NOTE — ED PROVIDER NOTE - CLINICAL SUMMARY MEDICAL DECISION MAKING FREE TEXT BOX
Pt with anxiety presenting with tachycarida and dyspnea most likely related to panic - no other acute reason for dyspnea, received xanax in ed and feels better requesting dc

## 2021-11-06 NOTE — ED ADULT TRIAGE NOTE - CHIEF COMPLAINT QUOTE
Pt presents to ED c/o shortness of breath, palpitations, chest pressure and dizziness. reports saw doctor yesterday, diagnosed with vertigo, given meclizine prescription. Today felt palpitations, shortness of breath. Noted to be tachycardic/hypertensive, increased work of breathing in triage. 12 lead ekg in progress.

## 2021-11-08 ENCOUNTER — NON-APPOINTMENT (OUTPATIENT)
Age: 41
End: 2021-11-08

## 2021-11-08 NOTE — PHYSICAL EXAM
[No Acute Distress] : no acute distress [Well Nourished] : well nourished [Well Developed] : well developed [Well-Appearing] : well-appearing [Normal Outer Ear/Nose] : the outer ears and nose were normal in appearance [No JVD] : no jugular venous distention [No Lymphadenopathy] : no lymphadenopathy [Supple] : supple [Thyroid Normal, No Nodules] : the thyroid was normal and there were no nodules present [No Respiratory Distress] : no respiratory distress  [No Accessory Muscle Use] : no accessory muscle use [Clear to Auscultation] : lungs were clear to auscultation bilaterally [Normal Rate] : normal rate  [Regular Rhythm] : with a regular rhythm [Normal S1, S2] : normal S1 and S2 [Soft] : abdomen soft [Non Tender] : non-tender [Non-distended] : non-distended [No Masses] : no abdominal mass palpated [No CVA Tenderness] : no CVA  tenderness [No Spinal Tenderness] : no spinal tenderness [No Joint Swelling] : no joint swelling [No Rash] : no rash [Coordination Grossly Intact] : coordination grossly intact [No Focal Deficits] : no focal deficits [Normal Gait] : normal gait [Deep Tendon Reflexes (DTR)] : deep tendon reflexes were 2+ and symmetric [Normal Affect] : the affect was normal [Alert and Oriented x3] : oriented to person, place, and time [Normal Insight/Judgement] : insight and judgment were intact [de-identified] : palpable lymphadenopathy of the anterior cervical node

## 2021-11-08 NOTE — END OF VISIT
[] : Resident [FreeTextEntry3] : Mild viral rhinitis sx. Supportive sx care. \par Occasional Intermittent dizziness with position changes, resolves on its own. - Meclizine\par \par RTO 3 mo for f/u of above problems.  [Time Spent: ___ minutes] : I have spent [unfilled] minutes of time on the encounter.

## 2021-11-08 NOTE — ASSESSMENT
[FreeTextEntry1] : 40F PMH HTN, HLD, depression, fibromyalgia and degenerative disk disease of the cervical spine as well as new onset of vertigo and possible AMBAR presents for follow up and with incidental URI. \par \par #URI\par Patient with suspected URI. Reports 2 days of sore throat, SOB and chills. Endorses mild myalgia in the back bilaterally. Denies cough, increased sputum production or objective fever. Denies known sick contacts. Reports she is fully vaccinated against COVID however is unvaccinated against influenza. Patient reports symptoms are controlled by ibuprofen alone. \par - Given 2 days of mild symptoms without objective fever, likely viral URI\par - Conservative management at this time\par - Advised to limit NSAID use as this will increase her BP\par - Advised of hand washing, continued use of face mask to limit spread\par \par #Depression\par Patient in office for follow up with depression. Currently on duloxetine 30 mg PO Qd. Reports she feels better and would not like to increase her medication dose\par - Continue to follow up, next step would be increase of duloxetine to 40 mg, then to 60 mg\par - Would avoid mirtazapine as an adjuvant medication due to sedating effect with concurrent possible AMBAR\par \par #vertigo\par - Trial of meclizine 12.5 mg PO Qd PRN \par \par #r/o obstructive sleep apnea\par Patient with daytime somnolence and wakening with sensation of being unable to breathe at night. Previously ordered for sleep studies and with insurance approval, however did not have this performed.\par - Referral sent for sleep studies\par \par #HTN\par Patient with a history of HTN, takes HCTZ 12.5 and losartan 50 PO Qd, reports good compliance. BP today 133/88\par - BP mildly elevated in the setting of pain in back and pain secondary to current URI as well as acute NSAID use\par - Continue medications at current dose, would consider increasing HCTZ to 25 if hypertensive at next office visit\par \par #Abnormal pap smear\par Patient is with abnormal pap smear and was referred to GYN, however has not gone\par - continued to encourage patient to follow up with GYN\par \par #HLD\par Currently on pravastatin 20 mg PO Qd, last lipid profile 1 year ago\par - Lipid profile today, consider increasing to moderate to high intensity statin based on results\par \par #HCM\par Fully covid vaccinated\par Not vaccinated for influenza however would like vaccine when feeling better\par \par RTC in 3 months for follow up and close monitoring

## 2021-11-08 NOTE — HISTORY OF PRESENT ILLNESS
[FreeTextEntry1] : URI and back pain [de-identified] : 40F PMH HTN, HLD, fibromyalgia and degenerative disk disease presents to the clinic for follow up. Of note, the patient has 2 days of sore throat, shortness of breath, chills and myalgias. Denies cough, denies rhinorrhea, denies recent sick contacts. Patient is fully covid vaccinated but is unvaccinated for influenza. Denies GI complaints. Patient reports she is here for routine follow up and infectious illness is incidental to appointment.\par With respect to chronic issues, the patient reports her depression is improved on duloxetine 30 mg PO Qd and she dose not want to increase her dose of this medication at this time. Patient was referred to GYN at last visit however she has not seen them yet. Patient is also reporting dizziness associated with near falls and tinitus of unknown etiology. \par Patient is also reporting consistent sleep problems. As per cardiology note, patient was to be sent for sleep studies, however patient was not aware of this at the time of exam.

## 2021-11-08 NOTE — REVIEW OF SYSTEMS
[Chills] : chills [Fatigue] : fatigue [Hoarseness] : hoarseness [Nasal Discharge] : nasal discharge [Shortness Of Breath] : shortness of breath [Dyspnea on Exertion] : dyspnea on exertion [Muscle Pain] : muscle pain [Back Pain] : back pain [Fever] : no fever [Hot Flashes] : no hot flashes [Night Sweats] : no night sweats [Recent Change In Weight] : ~T no recent weight change [Earache] : no earache [Hearing Loss] : no hearing loss [Nosebleed] : no nosebleeds [Sore Throat] : no sore throat [Postnasal Drip] : no postnasal drip [Chest Pain] : no chest pain [Palpitations] : no palpitations [Leg Claudication] : no leg claudication [Lower Ext Edema] : no lower extremity edema [Orthopnea] : no orthopnea [Paroxysmal Nocturnal Dyspnea] : no paroxysmal nocturnal dyspnea [Wheezing] : no wheezing [Cough] : no cough [Abdominal Pain] : no abdominal pain [Nausea] : no nausea [Diarrhea] : diarrhea [Vomiting] : no vomiting [Heartburn] : no heartburn [Dysuria] : no dysuria [Incontinence] : no incontinence [Hematuria] : no hematuria [Joint Pain] : no joint pain [Joint Stiffness] : no joint stiffness [Joint Swelling] : no joint swelling [Muscle Weakness] : no muscle weakness [Itching] : no itching [Mole Changes] : no mole changes [Skin Rash] : no skin rash [Headache] : no headache [Memory Loss] : no memory loss [Suicidal] : not suicidal [Insomnia] : no insomnia [Anxiety] : no anxiety [Depression] : no depression [Easy Bleeding] : no easy bleeding [Easy Bruising] : no easy bruising [Swollen Glands] : no swollen glands [FreeTextEntry4] : isabela

## 2021-11-10 LAB
CHOLEST SERPL-MCNC: 254 MG/DL
HDLC SERPL-MCNC: 48 MG/DL
LDLC SERPL CALC-MCNC: 175 MG/DL
NONHDLC SERPL-MCNC: 205 MG/DL
TRIGL SERPL-MCNC: 153 MG/DL

## 2021-11-10 RX ORDER — PRAVASTATIN SODIUM 20 MG/1
20 TABLET ORAL
Qty: 30 | Refills: 1 | Status: DISCONTINUED | COMMUNITY
Start: 2021-02-16 | End: 2021-11-10

## 2021-11-11 ENCOUNTER — APPOINTMENT (OUTPATIENT)
Dept: GASTROENTEROLOGY | Facility: CLINIC | Age: 41
End: 2021-11-11

## 2021-11-24 RX ORDER — OMEPRAZOLE MAGNESIUM 20 MG/1
20 TABLET, DELAYED RELEASE ORAL
Qty: 30 | Refills: 0 | Status: DISCONTINUED | COMMUNITY
Start: 2020-11-17 | End: 2021-11-24

## 2021-11-29 ENCOUNTER — NON-APPOINTMENT (OUTPATIENT)
Age: 41
End: 2021-11-29

## 2021-12-04 ENCOUNTER — RX RENEWAL (OUTPATIENT)
Age: 41
End: 2021-12-04

## 2021-12-06 ENCOUNTER — RX RENEWAL (OUTPATIENT)
Age: 41
End: 2021-12-06

## 2022-02-10 ENCOUNTER — APPOINTMENT (OUTPATIENT)
Dept: INTERNAL MEDICINE | Facility: CLINIC | Age: 42
End: 2022-02-10

## 2022-02-28 ENCOUNTER — APPOINTMENT (OUTPATIENT)
Dept: GASTROENTEROLOGY | Facility: CLINIC | Age: 42
End: 2022-02-28

## 2022-03-06 ENCOUNTER — RX RENEWAL (OUTPATIENT)
Age: 42
End: 2022-03-06

## 2022-04-08 ENCOUNTER — APPOINTMENT (OUTPATIENT)
Dept: INTERNAL MEDICINE | Facility: CLINIC | Age: 42
End: 2022-04-08
Payer: MEDICAID

## 2022-04-08 VITALS
HEART RATE: 84 BPM | OXYGEN SATURATION: 97 % | DIASTOLIC BLOOD PRESSURE: 63 MMHG | TEMPERATURE: 98 F | SYSTOLIC BLOOD PRESSURE: 104 MMHG

## 2022-04-08 PROCEDURE — 99214 OFFICE O/P EST MOD 30 MIN: CPT

## 2022-04-11 PROBLEM — Z11.59 SCREENING FOR VIRAL DISEASE: Status: ACTIVE | Noted: 2021-08-05

## 2022-06-10 ENCOUNTER — RX RENEWAL (OUTPATIENT)
Age: 42
End: 2022-06-10

## 2022-10-03 ENCOUNTER — RX RENEWAL (OUTPATIENT)
Age: 42
End: 2022-10-03

## 2022-11-15 ENCOUNTER — APPOINTMENT (OUTPATIENT)
Dept: INTERNAL MEDICINE | Facility: CLINIC | Age: 42
End: 2022-11-15

## 2022-11-15 VITALS
OXYGEN SATURATION: 98 % | SYSTOLIC BLOOD PRESSURE: 110 MMHG | TEMPERATURE: 97.3 F | BODY MASS INDEX: 28.35 KG/M2 | WEIGHT: 160 LBS | DIASTOLIC BLOOD PRESSURE: 74 MMHG | HEART RATE: 83 BPM | HEIGHT: 63 IN

## 2022-11-15 DIAGNOSIS — K04.8 RADICULAR CYST: ICD-10-CM

## 2022-11-15 DIAGNOSIS — R87.619 UNSPECIFIED ABNORMAL CYTOLOGICAL FINDINGS IN SPECIMENS FROM CERVIX UTERI: ICD-10-CM

## 2022-11-15 DIAGNOSIS — G89.29 LOW BACK PAIN, UNSPECIFIED: ICD-10-CM

## 2022-11-15 DIAGNOSIS — Z12.4 ENCOUNTER FOR SCREENING FOR MALIGNANT NEOPLASM OF CERVIX: ICD-10-CM

## 2022-11-15 DIAGNOSIS — M54.50 LOW BACK PAIN, UNSPECIFIED: ICD-10-CM

## 2022-11-15 PROCEDURE — 99214 OFFICE O/P EST MOD 30 MIN: CPT | Mod: GC,25

## 2022-12-30 ENCOUNTER — NON-APPOINTMENT (OUTPATIENT)
Age: 42
End: 2022-12-30

## 2022-12-30 ENCOUNTER — APPOINTMENT (OUTPATIENT)
Dept: HEART AND VASCULAR | Facility: CLINIC | Age: 42
End: 2022-12-30
Payer: MEDICAID

## 2022-12-30 VITALS
BODY MASS INDEX: 29.06 KG/M2 | WEIGHT: 164 LBS | TEMPERATURE: 98.7 F | SYSTOLIC BLOOD PRESSURE: 130 MMHG | DIASTOLIC BLOOD PRESSURE: 78 MMHG | HEIGHT: 63 IN | HEART RATE: 93 BPM | OXYGEN SATURATION: 97 %

## 2022-12-30 DIAGNOSIS — R06.83 SNORING: ICD-10-CM

## 2022-12-30 PROCEDURE — 99214 OFFICE O/P EST MOD 30 MIN: CPT | Mod: 25

## 2022-12-30 PROCEDURE — 93000 ELECTROCARDIOGRAM COMPLETE: CPT

## 2022-12-30 RX ORDER — HYDROCHLOROTHIAZIDE 12.5 MG/1
12.5 CAPSULE ORAL DAILY
Qty: 60 | Refills: 2 | Status: DISCONTINUED | COMMUNITY
Start: 2020-11-17 | End: 2022-12-30

## 2023-01-03 NOTE — ASSESSMENT
[FreeTextEntry1] : Palpitations will do holter echo and stress test \par soboe will do est\par fu pending test results

## 2023-01-03 NOTE — HISTORY OF PRESENT ILLNESS
[FreeTextEntry1] : 42 F HTN Fibromyalgia \par \par she is having night time palpitations and chest pain palpitations occur every three days she has a lot of muscular pain she is sob when she walks up and down stairs her chest pain is muscle spasm radiating to both hands pain occurs at rest or activity no triggers to her palpitations \par \par \par \par ecg nsr nsst changes 12/30/2022\par \par \par 3 healthy pregnancies \par sleep apnea snores a lot wakes up a lot at night poor sleep quality \par \par \par  [id number]

## 2023-01-06 ENCOUNTER — RX RENEWAL (OUTPATIENT)
Age: 43
End: 2023-01-06

## 2023-01-11 ENCOUNTER — APPOINTMENT (OUTPATIENT)
Dept: SLEEP CENTER | Facility: HOME HEALTH | Age: 43
End: 2023-01-11
Payer: MEDICAID

## 2023-01-11 ENCOUNTER — OUTPATIENT (OUTPATIENT)
Dept: OUTPATIENT SERVICES | Facility: HOSPITAL | Age: 43
LOS: 1 days | End: 2023-01-11
Payer: MEDICAID

## 2023-01-11 PROCEDURE — 95800 SLP STDY UNATTENDED: CPT

## 2023-01-11 PROCEDURE — 95800 SLP STDY UNATTENDED: CPT | Mod: 26

## 2023-01-12 DIAGNOSIS — G47.33 OBSTRUCTIVE SLEEP APNEA (ADULT) (PEDIATRIC): ICD-10-CM

## 2023-01-31 ENCOUNTER — APPOINTMENT (OUTPATIENT)
Dept: HEART AND VASCULAR | Facility: CLINIC | Age: 43
End: 2023-01-31

## 2023-02-17 ENCOUNTER — APPOINTMENT (OUTPATIENT)
Age: 43
End: 2023-02-17

## 2023-02-17 ENCOUNTER — APPOINTMENT (OUTPATIENT)
Dept: HEART AND VASCULAR | Facility: CLINIC | Age: 43
End: 2023-02-17
Payer: MEDICAID

## 2023-02-17 DIAGNOSIS — R07.89 OTHER CHEST PAIN: ICD-10-CM

## 2023-02-17 PROCEDURE — XXXXX: CPT | Mod: 1L

## 2023-02-17 PROCEDURE — 93306 TTE W/DOPPLER COMPLETE: CPT

## 2023-02-17 PROCEDURE — ZZZZZ: CPT

## 2023-02-17 PROCEDURE — 93015 CV STRESS TEST SUPVJ I&R: CPT

## 2023-02-21 ENCOUNTER — APPOINTMENT (OUTPATIENT)
Dept: INTERNAL MEDICINE | Facility: CLINIC | Age: 43
End: 2023-02-21
Payer: MEDICAID

## 2023-02-21 DIAGNOSIS — R00.2 PALPITATIONS: ICD-10-CM

## 2023-02-21 DIAGNOSIS — R52 PAIN, UNSPECIFIED: ICD-10-CM

## 2023-02-21 DIAGNOSIS — G47.33 OBSTRUCTIVE SLEEP APNEA (ADULT) (PEDIATRIC): ICD-10-CM

## 2023-02-21 PROCEDURE — 99443: CPT

## 2023-02-22 PROBLEM — R07.89 CHEST DISCOMFORT: Status: ACTIVE | Noted: 2022-04-08

## 2023-03-03 ENCOUNTER — APPOINTMENT (OUTPATIENT)
Dept: HEART AND VASCULAR | Facility: CLINIC | Age: 43
End: 2023-03-03
Payer: MEDICAID

## 2023-03-03 DIAGNOSIS — R94.39 ABNORMAL RESULT OF OTHER CARDIOVASCULAR FUNCTION STUDY: ICD-10-CM

## 2023-03-03 PROCEDURE — 93351 STRESS TTE COMPLETE: CPT

## 2023-03-03 PROCEDURE — 93306 TTE W/DOPPLER COMPLETE: CPT | Mod: 59

## 2023-03-03 PROCEDURE — ZZZZZ: CPT

## 2023-03-06 PROBLEM — R94.39 ABNORMAL STRESS TEST: Status: ACTIVE | Noted: 2023-02-17

## 2023-04-10 ENCOUNTER — RX RENEWAL (OUTPATIENT)
Age: 43
End: 2023-04-10

## 2023-04-14 ENCOUNTER — APPOINTMENT (OUTPATIENT)
Dept: PULMONOLOGY | Facility: CLINIC | Age: 43
End: 2023-04-14

## 2023-05-30 ENCOUNTER — APPOINTMENT (OUTPATIENT)
Dept: INTERNAL MEDICINE | Facility: CLINIC | Age: 43
End: 2023-05-30

## 2023-07-06 ENCOUNTER — APPOINTMENT (OUTPATIENT)
Dept: INTERNAL MEDICINE | Facility: CLINIC | Age: 43
End: 2023-07-06
Payer: MEDICAID

## 2023-07-06 VITALS
HEART RATE: 80 BPM | BODY MASS INDEX: 29.41 KG/M2 | HEIGHT: 63 IN | SYSTOLIC BLOOD PRESSURE: 128 MMHG | WEIGHT: 166 LBS | TEMPERATURE: 97 F | DIASTOLIC BLOOD PRESSURE: 84 MMHG | OXYGEN SATURATION: 97 %

## 2023-07-06 DIAGNOSIS — D24.1 BENIGN NEOPLASM OF RIGHT BREAST: ICD-10-CM

## 2023-07-06 DIAGNOSIS — Z00.00 ENCOUNTER FOR GENERAL ADULT MEDICAL EXAMINATION W/OUT ABNORMAL FINDINGS: ICD-10-CM

## 2023-07-06 DIAGNOSIS — N64.4 MASTODYNIA: ICD-10-CM

## 2023-07-06 PROCEDURE — 99213 OFFICE O/P EST LOW 20 MIN: CPT | Mod: 25,GC

## 2023-07-06 PROCEDURE — 99396 PREV VISIT EST AGE 40-64: CPT | Mod: 25

## 2023-07-06 PROCEDURE — 36415 COLL VENOUS BLD VENIPUNCTURE: CPT

## 2023-07-06 RX ORDER — CYCLOBENZAPRINE HYDROCHLORIDE 5 MG/1
5 TABLET, FILM COATED ORAL
Qty: 30 | Refills: 0 | Status: ACTIVE | COMMUNITY
Start: 2023-07-06 | End: 1900-01-01

## 2023-07-06 RX ORDER — ATORVASTATIN CALCIUM 40 MG/1
40 TABLET, FILM COATED ORAL
Qty: 90 | Refills: 0 | Status: ACTIVE | COMMUNITY
Start: 2021-11-10 | End: 1900-01-01

## 2023-07-11 DIAGNOSIS — M79.10 MYALGIA, UNSPECIFIED SITE: ICD-10-CM

## 2023-07-11 DIAGNOSIS — E78.5 HYPERLIPIDEMIA, UNSPECIFIED: ICD-10-CM

## 2023-07-25 ENCOUNTER — RESULT REVIEW (OUTPATIENT)
Age: 43
End: 2023-07-25

## 2023-07-25 ENCOUNTER — APPOINTMENT (OUTPATIENT)
Dept: ULTRASOUND IMAGING | Facility: HOSPITAL | Age: 43
End: 2023-07-25
Payer: MEDICAID

## 2023-07-25 ENCOUNTER — OUTPATIENT (OUTPATIENT)
Dept: OUTPATIENT SERVICES | Facility: HOSPITAL | Age: 43
LOS: 1 days | End: 2023-07-25
Payer: MEDICAID

## 2023-07-25 ENCOUNTER — APPOINTMENT (OUTPATIENT)
Dept: MAMMOGRAPHY | Facility: HOSPITAL | Age: 43
End: 2023-07-25
Payer: MEDICAID

## 2023-07-25 PROCEDURE — 76641 ULTRASOUND BREAST COMPLETE: CPT

## 2023-07-25 PROCEDURE — G0279: CPT

## 2023-07-25 PROCEDURE — 76641 ULTRASOUND BREAST COMPLETE: CPT | Mod: 26,50

## 2023-07-25 PROCEDURE — 77062 BREAST TOMOSYNTHESIS BI: CPT | Mod: 26

## 2023-07-25 PROCEDURE — 77066 DX MAMMO INCL CAD BI: CPT | Mod: 26

## 2023-07-25 PROCEDURE — 77066 DX MAMMO INCL CAD BI: CPT

## 2023-07-26 ENCOUNTER — NON-APPOINTMENT (OUTPATIENT)
Age: 43
End: 2023-07-26

## 2023-08-17 ENCOUNTER — APPOINTMENT (OUTPATIENT)
Age: 43
End: 2023-08-17

## 2023-08-17 ENCOUNTER — APPOINTMENT (OUTPATIENT)
Dept: INTERNAL MEDICINE | Facility: CLINIC | Age: 43
End: 2023-08-17
Payer: MEDICAID

## 2023-08-17 VITALS
HEART RATE: 82 BPM | SYSTOLIC BLOOD PRESSURE: 109 MMHG | OXYGEN SATURATION: 95 % | DIASTOLIC BLOOD PRESSURE: 76 MMHG | TEMPERATURE: 98.6 F | BODY MASS INDEX: 30.36 KG/M2 | HEIGHT: 62 IN | WEIGHT: 165 LBS

## 2023-08-17 DIAGNOSIS — L70.9 ACNE, UNSPECIFIED: ICD-10-CM

## 2023-08-17 DIAGNOSIS — G89.29 PAIN IN RIGHT SHOULDER: ICD-10-CM

## 2023-08-17 DIAGNOSIS — Z01.419 ENCOUNTER FOR GYNECOLOGICAL EXAMINATION (GENERAL) (ROUTINE) W/OUT ABNORMAL FINDINGS: ICD-10-CM

## 2023-08-17 DIAGNOSIS — M25.512 PAIN IN RIGHT SHOULDER: ICD-10-CM

## 2023-08-17 DIAGNOSIS — M54.10 RADICULOPATHY, SITE UNSPECIFIED: ICD-10-CM

## 2023-08-17 DIAGNOSIS — M25.511 PAIN IN RIGHT SHOULDER: ICD-10-CM

## 2023-08-17 PROCEDURE — 99214 OFFICE O/P EST MOD 30 MIN: CPT | Mod: GC

## 2023-08-17 RX ORDER — LIDOCAINE 40 MG/G
4 PATCH TOPICAL
Qty: 14 | Refills: 0 | Status: ACTIVE | COMMUNITY
Start: 2023-08-17 | End: 1900-01-01

## 2023-08-17 RX ORDER — ESOMEPRAZOLE MAGNESIUM 20 MG/1
20 CAPSULE, DELAYED RELEASE ORAL DAILY
Qty: 30 | Refills: 2 | Status: DISCONTINUED | COMMUNITY
Start: 2021-11-24 | End: 2023-08-17

## 2023-08-17 RX ORDER — GABAPENTIN 300 MG/1
300 CAPSULE ORAL
Qty: 30 | Refills: 2 | Status: ACTIVE | COMMUNITY
Start: 2023-08-17 | End: 1900-01-01

## 2023-08-17 RX ORDER — CYCLOBENZAPRINE HYDROCHLORIDE 10 MG/1
10 TABLET, FILM COATED ORAL DAILY
Refills: 0 | Status: DISCONTINUED | COMMUNITY
End: 2023-08-17

## 2023-08-17 RX ORDER — BACLOFEN 5 MG/1
5 TABLET ORAL
Qty: 5 | Refills: 0 | Status: DISCONTINUED | COMMUNITY
Start: 2020-11-17 | End: 2023-08-17

## 2023-08-17 RX ORDER — BENZOYL PEROXIDE 2.5 G/100G
2.5 GEL TOPICAL TWICE DAILY
Qty: 1 | Refills: 0 | Status: ACTIVE | COMMUNITY
Start: 2023-08-17 | End: 1900-01-01

## 2023-08-17 NOTE — HISTORY OF PRESENT ILLNESS
[FreeTextEntry1] : Patient complain of shoulder, neck and back pain.  [de-identified] : 43 yo Ukrainian-speaking F with PMHx HTN, HLD, fibromyalgia, cervical spine degenerative disc disease presents for lower back pain and L hip pain. Sxs have been intermittent for multiple months but worsened in the last week. Pain is worse upon movement and radiates from lower back to L leg. Tried cymbalta when previously prescribed but developed headaches and nausea so she stopped it. Has been to PT in the past, says it helped her. No numbness, tingling, incontinence.

## 2023-08-17 NOTE — END OF VISIT
[] : Resident [FreeTextEntry3] : benign mammo and US, since last visit!  Shoulder pain - lidocaine patch, nsaid. PT

## 2023-08-17 NOTE — PHYSICAL EXAM
[Normal Sclera/Conjunctiva] : normal sclera/conjunctiva [Normal Outer Ear/Nose] : the outer ears and nose were normal in appearance [No Edema] : there was no peripheral edema [Normal] : soft, non-tender, non-distended, no masses palpated, no HSM and normal bowel sounds [No Spinal Tenderness] : no spinal tenderness [Coordination Grossly Intact] : coordination grossly intact [Normal Gait] : normal gait [de-identified] : +L straight leg test [de-identified] : +acne on upper chest

## 2023-08-17 NOTE — HEALTH RISK ASSESSMENT
[0] : 2) Feeling down, depressed, or hopeless: Not at all (0) [PHQ-2 Negative - No further assessment needed] : PHQ-2 Negative - No further assessment needed [[id number]] : 0

## 2023-08-17 NOTE — REVIEW OF SYSTEMS
[Joint Pain] : joint pain [Muscle Pain] : muscle pain [Back Pain] : back pain [Skin Rash] : skin rash [Negative] : Heme/Lymph [de-identified] : chest rash

## 2023-08-17 NOTE — ASSESSMENT
[FreeTextEntry1] : HCM - diagnostic mammography negative for suspicious findings, US breast showing fibroglandular changes  RTC 6 months or prn

## 2023-08-24 LAB
ANION GAP SERPL CALC-SCNC: 13 MMOL/L
BUN SERPL-MCNC: 13 MG/DL
CALCIUM SERPL-MCNC: 10.1 MG/DL
CHLORIDE SERPL-SCNC: 103 MMOL/L
CHOLEST SERPL-MCNC: 213 MG/DL
CO2 SERPL-SCNC: 23 MMOL/L
CREAT SERPL-MCNC: 0.7 MG/DL
EGFR: 110 ML/MIN/1.73M2
GLUCOSE SERPL-MCNC: 91 MG/DL
HCG SERPL QL: NEGATIVE
HDLC SERPL-MCNC: 51 MG/DL
LDLC SERPL CALC-MCNC: 138 MG/DL
NONHDLC SERPL-MCNC: 163 MG/DL
PAPP-A SERPL-ACNC: <1 MIU/ML
POTASSIUM SERPL-SCNC: 4.4 MMOL/L
SODIUM SERPL-SCNC: 139 MMOL/L
TRIGL SERPL-MCNC: 124 MG/DL

## 2024-05-01 ENCOUNTER — APPOINTMENT (OUTPATIENT)
Dept: INTERNAL MEDICINE | Facility: CLINIC | Age: 44
End: 2024-05-01
Payer: MEDICAID

## 2024-05-01 VITALS
WEIGHT: 159 LBS | HEART RATE: 90 BPM | SYSTOLIC BLOOD PRESSURE: 135 MMHG | OXYGEN SATURATION: 100 % | DIASTOLIC BLOOD PRESSURE: 90 MMHG | BODY MASS INDEX: 29.26 KG/M2 | TEMPERATURE: 97.6 F | HEIGHT: 62 IN

## 2024-05-01 DIAGNOSIS — R42 DIZZINESS AND GIDDINESS: ICD-10-CM

## 2024-05-01 DIAGNOSIS — L72.9 FOLLICULAR CYST OF THE SKIN AND SUBCUTANEOUS TISSUE, UNSPECIFIED: ICD-10-CM

## 2024-05-01 DIAGNOSIS — I10 ESSENTIAL (PRIMARY) HYPERTENSION: ICD-10-CM

## 2024-05-01 DIAGNOSIS — M79.7 FIBROMYALGIA: ICD-10-CM

## 2024-05-01 PROCEDURE — 36415 COLL VENOUS BLD VENIPUNCTURE: CPT

## 2024-05-01 PROCEDURE — 99214 OFFICE O/P EST MOD 30 MIN: CPT | Mod: GC,25

## 2024-05-01 PROCEDURE — G2211 COMPLEX E/M VISIT ADD ON: CPT | Mod: NC,1L

## 2024-05-01 PROCEDURE — 93000 ELECTROCARDIOGRAM COMPLETE: CPT

## 2024-05-01 RX ORDER — LOSARTAN POTASSIUM 50 MG/1
50 TABLET, FILM COATED ORAL
Qty: 30 | Refills: 2 | Status: ACTIVE | COMMUNITY
Start: 2020-11-17 | End: 1900-01-01

## 2024-05-01 RX ORDER — MECLIZINE HYDROCHLORIDE 12.5 MG/1
12.5 TABLET ORAL DAILY
Qty: 30 | Refills: 0 | Status: ACTIVE | COMMUNITY
Start: 2024-05-01 | End: 1900-01-01

## 2024-05-01 RX ORDER — PROPRANOLOL HYDROCHLORIDE 10 MG/1
10 TABLET ORAL TWICE DAILY
Qty: 60 | Refills: 0 | Status: ACTIVE | COMMUNITY
Start: 2024-05-01 | End: 1900-01-01

## 2024-05-01 RX ORDER — MECLIZINE HYDROCHLORIDE 12.5 MG/1
12.5 TABLET ORAL DAILY
Qty: 30 | Refills: 1 | Status: DISCONTINUED | COMMUNITY
Start: 2021-11-05

## 2024-05-01 NOTE — HISTORY OF PRESENT ILLNESS
[FreeTextEntry1] : panic attacks [de-identified] : 42yo F PMH Htn, hld, fibrolyagia, cervical spine degerative disease who presents  Panic attacks since 2021, every 6 months or so. More frequent over last few months. Usually happens at home, or when picking up her kids from school. Can happen at any time of day. Does not wake up with them. Tried med once but had a panic attack after taking it, did not take it after. During attack feels dizzy, feels like she's falling down, weak knees, weak, blurry vision, feels like she can't get oxygen, sweaty, shaky. Has even called ambulance three times, they say her heart rate is fast but everything else was normal. two times a day, twice per week it happens. Happens when she's walking towards somewhere.

## 2024-05-01 NOTE — END OF VISIT
[] : Resident [FreeTextEntry3] :   #panic attacks since 2021. used to be every 6 months and now more common. two times a week  twice a day. she called ambulance three times when it happened and was told tachy. holter monitor 12/2022 was fine with cards for palpitations.  - see psychiatry . no recreational drugs. check labs. - start lexapro and propranolol prn or standing per her choice - get ekg today- fine

## 2024-05-01 NOTE — HISTORY OF PRESENT ILLNESS
[FreeTextEntry1] : panic attacks [de-identified] : 44yo F PMH Htn, hld, fibrolyagia, cervical spine degerative disease who presents  Panic attacks since 2021, every 6 months or so. More frequent over last few months. Usually happens at home, or when picking up her kids from school. Can happen at any time of day. Does not wake up with them. Tried med once but had a panic attack after taking it, did not take it after. During attack feels dizzy, feels like she's falling down, weak knees, weak, blurry vision, feels like she can't get oxygen, sweaty, shaky. Has even called ambulance three times, they say her heart rate is fast but everything else was normal. two times a day, twice per week it happens. Happens when she's walking towards somewhere.

## 2024-05-01 NOTE — PHYSICAL EXAM
[No Acute Distress] : no acute distress [Normal Sclera/Conjunctiva] : normal sclera/conjunctiva [EOMI] : extraocular movements intact [Normal Outer Ear/Nose] : the outer ears and nose were normal in appearance [Normal Oropharynx] : the oropharynx was normal [No JVD] : no jugular venous distention [Supple] : supple [Thyroid Normal, No Nodules] : the thyroid was normal and there were no nodules present [No Respiratory Distress] : no respiratory distress  [Clear to Auscultation] : lungs were clear to auscultation bilaterally [Normal Rate] : normal rate  [Regular Rhythm] : with a regular rhythm [Normal S1, S2] : normal S1 and S2 [No Murmur] : no murmur heard [No Edema] : there was no peripheral edema [Normal Posterior Cervical Nodes] : no posterior cervical lymphadenopathy [Normal Anterior Cervical Nodes] : no anterior cervical lymphadenopathy [No Rash] : no rash [Normal Insight/Judgement] : insight and judgment were intact [de-identified] : anxious

## 2024-05-01 NOTE — HEALTH RISK ASSESSMENT
[No] : No [0] : 2) Feeling down, depressed, or hopeless: Not at all (0) [PHQ-2 Negative - No further assessment needed] : PHQ-2 Negative - No further assessment needed [Never] : Never [[id number]] : 0

## 2024-05-01 NOTE — INTERPRETER SERVICES
[[employer] ] : provided by [employer]   [Interpreters_IDNumber] : [id number] [TWNoteComboBox1] : French

## 2024-05-01 NOTE — PHYSICAL EXAM
[No Acute Distress] : no acute distress [Normal Sclera/Conjunctiva] : normal sclera/conjunctiva [EOMI] : extraocular movements intact [Normal Outer Ear/Nose] : the outer ears and nose were normal in appearance [Normal Oropharynx] : the oropharynx was normal [No JVD] : no jugular venous distention [Supple] : supple [Thyroid Normal, No Nodules] : the thyroid was normal and there were no nodules present [No Respiratory Distress] : no respiratory distress  [Clear to Auscultation] : lungs were clear to auscultation bilaterally [Normal Rate] : normal rate  [Regular Rhythm] : with a regular rhythm [Normal S1, S2] : normal S1 and S2 [No Murmur] : no murmur heard [No Edema] : there was no peripheral edema [Normal Posterior Cervical Nodes] : no posterior cervical lymphadenopathy [Normal Anterior Cervical Nodes] : no anterior cervical lymphadenopathy [No Rash] : no rash [Normal Insight/Judgement] : insight and judgment were intact [de-identified] : anxious

## 2024-05-01 NOTE — INTERPRETER SERVICES
[[employer] ] : provided by [employer]   [Interpreters_IDNumber] : [id number] [TWNoteComboBox1] : Greek

## 2024-05-01 NOTE — REVIEW OF SYSTEMS
[Chills] : chills [Joint Pain] : joint pain [Headache] : headache [Anxiety] : anxiety [Night Sweats] : no night sweats [Vision Problems] : no vision problems

## 2024-05-01 NOTE — HEALTH RISK ASSESSMENT
[No] : No [0] : 2) Feeling down, depressed, or hopeless: Not at all (0) [PHQ-2 Negative - No further assessment needed] : PHQ-2 Negative - No further assessment needed [Never] : Never [[id number]] : 0

## 2024-05-02 LAB — TSH SERPL-ACNC: 3.34 UIU/ML

## 2024-05-14 ENCOUNTER — APPOINTMENT (OUTPATIENT)
Dept: DERMATOLOGY | Facility: CLINIC | Age: 44
End: 2024-05-14
Payer: MEDICAID

## 2024-05-14 VITALS — WEIGHT: 161 LBS | HEIGHT: 62 IN | BODY MASS INDEX: 29.63 KG/M2

## 2024-05-14 DIAGNOSIS — L72.11 PILAR CYST: ICD-10-CM

## 2024-05-14 PROCEDURE — 99203 OFFICE O/P NEW LOW 30 MIN: CPT

## 2024-05-14 NOTE — HISTORY OF PRESENT ILLNESS
[FreeTextEntry1] : NPV- scalp lesions [de-identified] : Dolly Mckeon is a 42 y/o F presenting to the clinic for lesions on scalp.   Marya [id number]  Has 4 lesions scattered on scalp noticed over past few years. One on occipital is tender to palpation. Would like removal.  Have had 1 bump removed in the past surgically on right frontal scalp about 12 years ago without issues. Was benign, does not know diagnosis.  PMH -no history of skin cancer  FHx  -No family history of melanoma

## 2024-05-14 NOTE — ASSESSMENT
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  risks and benefits were discussed.  Will schedule for excision x3  in procedure clinic.   RTC prn

## 2024-05-14 NOTE — PHYSICAL EXAM
[Alert] : alert [Oriented x 3] : ~L oriented x 3 [Well Nourished] : well nourished [Conjunctiva Non-injected] : conjunctiva non-injected [No Visual Lymphadenopathy] : no visual  lymphadenopathy [No Clubbing] : no clubbing [No Edema] : no edema [No Bromhidrosis] : no bromhidrosis [No Chromhidrosis] : no chromhidrosis [FreeTextEntry3] : L temporal , occipital , R temporal scalp, right occipital (much smaller) - mobile subcutaneous nodules without epidermal change.   L frontal scalp- well healed linear surgical scar

## 2024-05-15 ENCOUNTER — APPOINTMENT (OUTPATIENT)
Dept: INTERNAL MEDICINE | Facility: CLINIC | Age: 44
End: 2024-05-15
Payer: MEDICAID

## 2024-05-15 DIAGNOSIS — F41.0 PANIC DISORDER [EPISODIC PAROXYSMAL ANXIETY]: ICD-10-CM

## 2024-05-15 PROCEDURE — 99443: CPT | Mod: GC

## 2024-05-15 RX ORDER — ESCITALOPRAM OXALATE 10 MG/1
10 TABLET ORAL DAILY
Qty: 30 | Refills: 0 | Status: DISCONTINUED | COMMUNITY
Start: 2024-05-01 | End: 2024-05-15

## 2024-05-15 NOTE — REVIEW OF SYSTEMS
[Anxiety] : anxiety [Abdominal Pain] : no abdominal pain [Nausea] : no nausea [Vomiting] : no vomiting

## 2024-05-15 NOTE — END OF VISIT
[] : Resident [FreeTextEntry3] : #HTN-cnt med  #panic attacks- more of social anxiety when leaving house. sunitha gave her headaches and nausea/vomiting so stopped after 9 days. propranolol. can consider another medication - lot of fear/concern about leaving the house. needs to see therapist. try propranolol 1-2 pills 30 minutes whenever needs to leave the house   [Time Spent: ___ minutes] : I have spent [unfilled] minutes of time on the encounter.

## 2024-05-15 NOTE — HISTORY OF PRESENT ILLNESS
[Home] : at home, [unfilled] , at the time of the visit. [Medical Office: ([facility])___] : at the medical office located in  [Verbal consent obtained from patient] : the patient, [unfilled] [Interpreters_IDNumber] : [id number] [TWNoteComboBox1] : Croatian [FreeTextEntry1] : anxiety  [de-identified] : 44yo F PMH Htn, hld, fibrolyagia, cervical spine degenerative disease to follow up on anxiety and panic attacks. Feels better overall. Took lexapro for 9 days. Had to stop due to nausea vomiting headache fatigue. Was taking it with food. Symptoms went away after stopping medication. Has not taken propranolol yet. Had one panic attack but didnt have propranolol on her.

## 2024-06-04 ENCOUNTER — APPOINTMENT (OUTPATIENT)
Dept: DERMATOLOGY | Facility: CLINIC | Age: 44
End: 2024-06-04

## 2024-06-05 ENCOUNTER — APPOINTMENT (OUTPATIENT)
Dept: DERMATOLOGY | Facility: CLINIC | Age: 44
End: 2024-06-05

## 2024-07-08 NOTE — ED ADULT TRIAGE NOTE - PAIN: PRESENCE, MLM
Joon Huang MD  Performed by: Joon Huang MD  Authorized by: Joon Huang complains of pain/discomfort

## 2024-10-11 ENCOUNTER — EMERGENCY (EMERGENCY)
Facility: HOSPITAL | Age: 44
LOS: 1 days | Discharge: HOME | End: 2024-10-11
Attending: STUDENT IN AN ORGANIZED HEALTH CARE EDUCATION/TRAINING PROGRAM | Admitting: STUDENT IN AN ORGANIZED HEALTH CARE EDUCATION/TRAINING PROGRAM
Payer: MEDICAID

## 2024-10-11 VITALS
HEIGHT: 62 IN | DIASTOLIC BLOOD PRESSURE: 96 MMHG | RESPIRATION RATE: 18 BRPM | OXYGEN SATURATION: 97 % | SYSTOLIC BLOOD PRESSURE: 157 MMHG | TEMPERATURE: 99 F | HEART RATE: 110 BPM | WEIGHT: 160.06 LBS

## 2024-10-11 PROCEDURE — 99285 EMERGENCY DEPT VISIT HI MDM: CPT

## 2024-10-11 NOTE — ED ADULT NURSE NOTE - OBJECTIVE STATEMENT
Pt came in c/o R. sided chest pain radiating to left arm x3 days, pt states pain is sudden, comes and goes. Martin abd pain, n/v, sob, NAD at this time. safety maintained.

## 2024-10-12 VITALS
OXYGEN SATURATION: 98 % | SYSTOLIC BLOOD PRESSURE: 128 MMHG | RESPIRATION RATE: 18 BRPM | DIASTOLIC BLOOD PRESSURE: 76 MMHG | HEART RATE: 81 BPM

## 2024-10-12 LAB
ALBUMIN SERPL ELPH-MCNC: 4.4 G/DL — SIGNIFICANT CHANGE UP (ref 3.3–5)
ALP SERPL-CCNC: 55 U/L — SIGNIFICANT CHANGE UP (ref 40–120)
ALT FLD-CCNC: SIGNIFICANT CHANGE UP U/L (ref 10–45)
ANION GAP SERPL CALC-SCNC: 7 MMOL/L — SIGNIFICANT CHANGE UP (ref 5–17)
APPEARANCE UR: ABNORMAL
AST SERPL-CCNC: SIGNIFICANT CHANGE UP U/L (ref 10–40)
BACTERIA # UR AUTO: ABNORMAL /HPF
BASOPHILS # BLD AUTO: 0.04 K/UL — SIGNIFICANT CHANGE UP (ref 0–0.2)
BASOPHILS NFR BLD AUTO: 0.5 % — SIGNIFICANT CHANGE UP (ref 0–2)
BILIRUB SERPL-MCNC: 0.3 MG/DL — SIGNIFICANT CHANGE UP (ref 0.2–1.2)
BILIRUB UR-MCNC: NEGATIVE — SIGNIFICANT CHANGE UP
BUN SERPL-MCNC: 16 MG/DL — SIGNIFICANT CHANGE UP (ref 7–23)
CALCIUM SERPL-MCNC: 10.3 MG/DL — SIGNIFICANT CHANGE UP (ref 8.4–10.5)
CAST: 1 /LPF — SIGNIFICANT CHANGE UP (ref 0–4)
CHLORIDE SERPL-SCNC: 102 MMOL/L — SIGNIFICANT CHANGE UP (ref 96–108)
CO2 SERPL-SCNC: 25 MMOL/L — SIGNIFICANT CHANGE UP (ref 22–31)
COLOR SPEC: YELLOW — SIGNIFICANT CHANGE UP
CREAT SERPL-MCNC: 0.77 MG/DL — SIGNIFICANT CHANGE UP (ref 0.5–1.3)
DIFF PNL FLD: ABNORMAL
EGFR: 97 ML/MIN/1.73M2 — SIGNIFICANT CHANGE UP
EOSINOPHIL # BLD AUTO: 0.17 K/UL — SIGNIFICANT CHANGE UP (ref 0–0.5)
EOSINOPHIL NFR BLD AUTO: 2.1 % — SIGNIFICANT CHANGE UP (ref 0–6)
GLUCOSE SERPL-MCNC: 102 MG/DL — HIGH (ref 70–99)
GLUCOSE UR QL: NEGATIVE MG/DL — SIGNIFICANT CHANGE UP
HCT VFR BLD CALC: 35.1 % — SIGNIFICANT CHANGE UP (ref 34.5–45)
HGB BLD-MCNC: 11.5 G/DL — SIGNIFICANT CHANGE UP (ref 11.5–15.5)
IMM GRANULOCYTES NFR BLD AUTO: 0.4 % — SIGNIFICANT CHANGE UP (ref 0–0.9)
KETONES UR-MCNC: NEGATIVE MG/DL — SIGNIFICANT CHANGE UP
LEUKOCYTE ESTERASE UR-ACNC: ABNORMAL
LYMPHOCYTES # BLD AUTO: 2.85 K/UL — SIGNIFICANT CHANGE UP (ref 1–3.3)
LYMPHOCYTES # BLD AUTO: 34.5 % — SIGNIFICANT CHANGE UP (ref 13–44)
MCHC RBC-ENTMCNC: 27.1 PG — SIGNIFICANT CHANGE UP (ref 27–34)
MCHC RBC-ENTMCNC: 32.8 GM/DL — SIGNIFICANT CHANGE UP (ref 32–36)
MCV RBC AUTO: 82.8 FL — SIGNIFICANT CHANGE UP (ref 80–100)
MONOCYTES # BLD AUTO: 0.68 K/UL — SIGNIFICANT CHANGE UP (ref 0–0.9)
MONOCYTES NFR BLD AUTO: 8.2 % — SIGNIFICANT CHANGE UP (ref 2–14)
NEUTROPHILS # BLD AUTO: 4.49 K/UL — SIGNIFICANT CHANGE UP (ref 1.8–7.4)
NEUTROPHILS NFR BLD AUTO: 54.3 % — SIGNIFICANT CHANGE UP (ref 43–77)
NITRITE UR-MCNC: NEGATIVE — SIGNIFICANT CHANGE UP
NRBC # BLD: 0 /100 WBCS — SIGNIFICANT CHANGE UP (ref 0–0)
PH UR: 6 — SIGNIFICANT CHANGE UP (ref 5–8)
PLATELET # BLD AUTO: 362 K/UL — SIGNIFICANT CHANGE UP (ref 150–400)
POTASSIUM SERPL-MCNC: SIGNIFICANT CHANGE UP MMOL/L (ref 3.5–5.3)
POTASSIUM SERPL-SCNC: SIGNIFICANT CHANGE UP MMOL/L (ref 3.5–5.3)
PROT SERPL-MCNC: 7.4 G/DL — SIGNIFICANT CHANGE UP (ref 6–8.3)
PROT UR-MCNC: NEGATIVE MG/DL — SIGNIFICANT CHANGE UP
RBC # BLD: 4.24 M/UL — SIGNIFICANT CHANGE UP (ref 3.8–5.2)
RBC # FLD: 13.4 % — SIGNIFICANT CHANGE UP (ref 10.3–14.5)
RBC CASTS # UR COMP ASSIST: 8 /HPF — HIGH (ref 0–4)
SODIUM SERPL-SCNC: 134 MMOL/L — LOW (ref 135–145)
SP GR SPEC: 1.02 — SIGNIFICANT CHANGE UP (ref 1–1.03)
SQUAMOUS # UR AUTO: 11 /HPF — HIGH (ref 0–5)
TROPONIN T, HIGH SENSITIVITY RESULT: <6 NG/L — SIGNIFICANT CHANGE UP (ref 0–51)
UROBILINOGEN FLD QL: 0.2 MG/DL — SIGNIFICANT CHANGE UP (ref 0.2–1)
WBC # BLD: 8.26 K/UL — SIGNIFICANT CHANGE UP (ref 3.8–10.5)
WBC # FLD AUTO: 8.26 K/UL — SIGNIFICANT CHANGE UP (ref 3.8–10.5)
WBC UR QL: 4 /HPF — SIGNIFICANT CHANGE UP (ref 0–5)

## 2024-10-12 PROCEDURE — 71045 X-RAY EXAM CHEST 1 VIEW: CPT

## 2024-10-12 PROCEDURE — 81001 URINALYSIS AUTO W/SCOPE: CPT

## 2024-10-12 PROCEDURE — 85025 COMPLETE CBC W/AUTO DIFF WBC: CPT

## 2024-10-12 PROCEDURE — 71045 X-RAY EXAM CHEST 1 VIEW: CPT | Mod: 26

## 2024-10-12 PROCEDURE — 36415 COLL VENOUS BLD VENIPUNCTURE: CPT

## 2024-10-12 PROCEDURE — 80053 COMPREHEN METABOLIC PANEL: CPT

## 2024-10-12 PROCEDURE — 84484 ASSAY OF TROPONIN QUANT: CPT

## 2024-10-12 NOTE — ED PROVIDER NOTE - NSFOLLOWUPINSTRUCTIONS_ED_ALL_ED_FT
Yoel un seguimiento con meraz médico de cabecera lo antes posible.    Yoel un seguimiento con otorrinolaringología (oído, nariz y garganta); para programar elidia mahad, llame al [phone].    Regrese al departamento de emergencias si negra síntomas empeoran o si desarrolla nuevos síntomas.  Si tiene algún problema con el seguimiento, llame al Coordinador de referencias de urgencias al [phone].    Dolor en el pecho    El dolor de pecho puede ser causado por muchas condiciones diferentes que pueden ser peligrosas o no. Las causas incluyen acidez de estómago, infecciones pulmonares, ataques cardíacos, coágulos de veronica en los pulmones, infecciones de la piel, tensión o daño a los músculos, cartílagos o huesos, etc. Además de los antecedentes y el examen físico, es posible que se realice un electrocardiograma (ECG) u otras pruebas de laboratorio. Se dill realizado pruebas para determinar la causa de meraz dolor en el pecho. Yoel un seguimiento con meraz proveedor de atención primaria o con un cardiólogo según las instrucciones.     BUSQUE ATENCIÓN MÉDICA INMEDIATA SI TIENE ALGUNO DE LOS SIGUIENTES SÍNTOMAS: empeoramiento del dolor en el pecho, tos con veronica, dolor inexplicable de espalda/bolivar/mandíbula, dolor abdominal intenso, mareos o aturdimiento, desmayos, dificultad para respirar, piel sudorosa o húmeda, vómitos, o latidos cardíacos acelerados. Estos síntomas pueden representar un problema grave que constituye elidia emergencia. No espere a tigist si los síntomas desaparecen. Obtenga ayuda médica de inmediato. Llame al [phone] y no conduzca hasta el [facility].

## 2024-10-12 NOTE — ED PROVIDER NOTE - CLINICAL SUMMARY MEDICAL DECISION MAKING FREE TEXT BOX
Low suspicion for serious etiology of chest pain. Not likely to be ACS, given negative troponin, ttp of chest wall and unremarkable EKG with non-concerning HEART score. Not likely to be cardiac tamponade, given cardiopulmonary exam findings and normal vital signs. Not likely to be pneumothorax, given BL lung sounds heard on examination, CXR neg. Not likely to be pneumonia, given no fever/cough/other respiratory symptoms or systemic symptoms, CXR neg. Not likely to be PE, no hypoxia or SOB. Not likely to be aortic dissection, given normal vitals with no pulse deficits and unlikely presentation.    Pt also has chronic intermittent vertigo, no acute changes, no current symptoms. Requesting ENT f/u.

## 2024-10-12 NOTE — ED PROVIDER NOTE - PATIENT PORTAL LINK FT
You can access the FollowMyHealth Patient Portal offered by [facility] by registering at the following website: [email or web address]. By joining [facility]’s FollowMyHealth portal, you will also be able to view your health information using other applications (apps) compatible with our system.

## 2024-10-12 NOTE — ED PROVIDER NOTE - OBJECTIVE STATEMENT
42 yo F w PMH of anxiety, asthma, fibromyalgia, p/w chest pain since 24 hrs ago. Pain is described as sharp, L-sided, non-exertional, radiating to L arm, non-pleuritic, non-positional. She is ttp of chest wall and LUE. No SOB, leg swelling, cough, hemoptysis, exogenous estrogen, recent travel, surgery, malignancy, personal or FHx of VTE.

## 2024-10-12 NOTE — ED ADULT NURSE REASSESSMENT NOTE - NS ED NURSE REASSESS COMMENT FT1
Pt. evaluated by MD and pt. stable for discharge  Pt. ambulated with a steady gait  Pt. in no distress and well appearing at the time of discharge  IV removed, catheter intact and bleeding controlled  An  via Language Line was utilized

## 2024-10-12 NOTE — ED PROVIDER NOTE - PHYSICAL EXAMINATION
CONSTITUTIONAL: Non-toxic; in no apparent distress  HEAD: Normocephalic; atraumatic  EYES: PERRL; EOM intact   ENMT: External appears normal  NECK: Supple; non-tender  CARD: Normal S1, S2; no murmurs, rubs, or gallops  CHEST WALL: + TTP of L chest wall, no deformity, no rash  RESP: Normal chest excursion with respiration; breath sounds clear and equal bilaterally  ABD: Soft, non-distended; non-tender  EXT: Normal ROM in all four extremities; non-tender to palpation, no peripheral edema  SKIN: Warm, dry, no rash  NEURO:  No focal neurological deficiencies

## 2024-10-15 DIAGNOSIS — M79.7 FIBROMYALGIA: ICD-10-CM

## 2024-10-15 DIAGNOSIS — M79.642 PAIN IN LEFT HAND: ICD-10-CM

## 2024-10-15 DIAGNOSIS — J45.909 UNSPECIFIED ASTHMA, UNCOMPLICATED: ICD-10-CM

## 2024-10-15 DIAGNOSIS — R07.89 OTHER CHEST PAIN: ICD-10-CM

## 2024-10-15 DIAGNOSIS — R42 DIZZINESS AND GIDDINESS: ICD-10-CM

## 2024-11-27 ENCOUNTER — APPOINTMENT (OUTPATIENT)
Dept: INTERNAL MEDICINE | Facility: CLINIC | Age: 44
End: 2024-11-27

## 2024-11-27 VITALS
TEMPERATURE: 98.6 F | SYSTOLIC BLOOD PRESSURE: 133 MMHG | OXYGEN SATURATION: 100 % | WEIGHT: 161 LBS | DIASTOLIC BLOOD PRESSURE: 89 MMHG | HEIGHT: 62 IN | HEART RATE: 108 BPM | BODY MASS INDEX: 29.63 KG/M2

## 2024-11-27 DIAGNOSIS — E78.5 HYPERLIPIDEMIA, UNSPECIFIED: ICD-10-CM

## 2024-11-27 DIAGNOSIS — Z12.9 ENCOUNTER FOR SCREENING FOR MALIGNANT NEOPLASM, SITE UNSPECIFIED: ICD-10-CM

## 2024-11-27 DIAGNOSIS — M79.7 FIBROMYALGIA: ICD-10-CM

## 2024-11-27 DIAGNOSIS — F41.0 PANIC DISORDER [EPISODIC PAROXYSMAL ANXIETY]: ICD-10-CM

## 2024-11-27 DIAGNOSIS — R42 DIZZINESS AND GIDDINESS: ICD-10-CM

## 2024-11-27 PROCEDURE — 36415 COLL VENOUS BLD VENIPUNCTURE: CPT

## 2024-11-27 PROCEDURE — 99396 PREV VISIT EST AGE 40-64: CPT

## 2024-11-27 PROCEDURE — 99213 OFFICE O/P EST LOW 20 MIN: CPT | Mod: 25

## 2024-11-27 PROCEDURE — G0439: CPT | Mod: 25

## 2024-12-02 LAB
ANION GAP SERPL CALC-SCNC: 10 MMOL/L
BUN SERPL-MCNC: 12 MG/DL
CALCIUM SERPL-MCNC: 10.3 MG/DL
CHLORIDE SERPL-SCNC: 106 MMOL/L
CHOLEST SERPL-MCNC: 286 MG/DL
CHOLEST/HDLC SERPL: 6 RATIO
CO2 SERPL-SCNC: 24 MMOL/L
CREAT SERPL-MCNC: 0.84 MG/DL
EGFR: 87 ML/MIN/1.73M2
ESTIMATED AVERAGE GLUCOSE: 108 MG/DL
GLUCOSE SERPL-MCNC: 104 MG/DL
HBA1C MFR BLD HPLC: 5.4 %
HDLC SERPL-MCNC: 48 MG/DL
LDLC SERPL CALC-MCNC: 180 MG/DL
NONHDLC SERPL-MCNC: 238 MG/DL
POTASSIUM SERPL-SCNC: 3.8 MMOL/L
SODIUM SERPL-SCNC: 140 MMOL/L
TRIGL SERPL-MCNC: 298 MG/DL

## 2024-12-06 ENCOUNTER — NON-APPOINTMENT (OUTPATIENT)
Age: 44
End: 2024-12-06

## 2024-12-06 LAB — HEMOCCULT STL QL IA: NEGATIVE

## 2024-12-10 ENCOUNTER — APPOINTMENT (OUTPATIENT)
Dept: INTERNAL MEDICINE | Facility: CLINIC | Age: 44
End: 2024-12-10

## 2024-12-10 ENCOUNTER — RESULT REVIEW (OUTPATIENT)
Age: 44
End: 2024-12-10

## 2024-12-19 ENCOUNTER — OUTPATIENT (OUTPATIENT)
Dept: OUTPATIENT SERVICES | Facility: HOSPITAL | Age: 44
LOS: 1 days | End: 2024-12-19
Payer: MEDICAID

## 2024-12-19 ENCOUNTER — APPOINTMENT (OUTPATIENT)
Dept: MRI IMAGING | Facility: HOSPITAL | Age: 44
End: 2024-12-19

## 2024-12-19 PROCEDURE — 70551 MRI BRAIN STEM W/O DYE: CPT | Mod: 26

## 2024-12-19 PROCEDURE — 70551 MRI BRAIN STEM W/O DYE: CPT

## 2024-12-20 ENCOUNTER — NON-APPOINTMENT (OUTPATIENT)
Age: 44
End: 2024-12-20

## 2024-12-24 ENCOUNTER — APPOINTMENT (OUTPATIENT)
Dept: INTERNAL MEDICINE | Facility: CLINIC | Age: 44
End: 2024-12-24
Payer: MEDICAID

## 2024-12-24 DIAGNOSIS — M54.2 CERVICALGIA: ICD-10-CM

## 2024-12-24 DIAGNOSIS — H53.8 OTHER VISUAL DISTURBANCES: ICD-10-CM

## 2024-12-24 DIAGNOSIS — R42 DIZZINESS AND GIDDINESS: ICD-10-CM

## 2024-12-24 DIAGNOSIS — E78.5 HYPERLIPIDEMIA, UNSPECIFIED: ICD-10-CM

## 2024-12-24 PROCEDURE — 99214 OFFICE O/P EST MOD 30 MIN: CPT | Mod: 95,GC

## 2024-12-24 PROCEDURE — G2211 COMPLEX E/M VISIT ADD ON: CPT | Mod: NC
